# Patient Record
Sex: MALE | Race: WHITE | Employment: FULL TIME | URBAN - METROPOLITAN AREA
[De-identification: names, ages, dates, MRNs, and addresses within clinical notes are randomized per-mention and may not be internally consistent; named-entity substitution may affect disease eponyms.]

---

## 2018-11-23 ENCOUNTER — RADIANT APPOINTMENT (OUTPATIENT)
Dept: GENERAL RADIOLOGY | Facility: CLINIC | Age: 23
End: 2018-11-23
Attending: STUDENT IN AN ORGANIZED HEALTH CARE EDUCATION/TRAINING PROGRAM
Payer: OTHER MISCELLANEOUS

## 2018-11-23 ENCOUNTER — OFFICE VISIT (OUTPATIENT)
Dept: URGENT CARE | Facility: URGENT CARE | Age: 23
End: 2018-11-23
Payer: OTHER MISCELLANEOUS

## 2018-11-23 VITALS
WEIGHT: 244.6 LBS | SYSTOLIC BLOOD PRESSURE: 128 MMHG | DIASTOLIC BLOOD PRESSURE: 70 MMHG | HEART RATE: 72 BPM | TEMPERATURE: 98.4 F | OXYGEN SATURATION: 98 %

## 2018-11-23 DIAGNOSIS — S99.921A INJURY OF RIGHT FOOT, INITIAL ENCOUNTER: ICD-10-CM

## 2018-11-23 DIAGNOSIS — S99.921A INJURY OF RIGHT FOOT, INITIAL ENCOUNTER: Primary | ICD-10-CM

## 2018-11-23 PROCEDURE — 99203 OFFICE O/P NEW LOW 30 MIN: CPT | Performed by: STUDENT IN AN ORGANIZED HEALTH CARE EDUCATION/TRAINING PROGRAM

## 2018-11-23 PROCEDURE — 73630 X-RAY EXAM OF FOOT: CPT | Mod: RT

## 2018-11-23 NOTE — PROGRESS NOTES
Subjective:   Nathan Nunez is a 23 year old male who presents for   Chief Complaint   Patient presents with     Work joblocal     11/22/2018 right ankle injury      Patient is here today for evaluation of right foot pain after an injury at work.  He is a night food  at [employer]  This happened on 11/22/18 at roughly 10:15 PM.  He reports that he stepped with his right foot onto a pallet to elevate himself up to get a item higher up on a shelf where he had an eversion injury to his ankle that caused him to fall.  He reports being able to immediately bear weight on his right foot, but this was significantly painful.  He did note that it was swollen over the medial aspect of his foot after this occurred, but with ice this has improved some.  He has continued to be able to bear weight, but is walking with a limp.  His pain is most localized over his navicular bone, but does have some lateral foot pain as well.  No numbness or tingling into his toes.  When he fell he did not injure any other part of his body.  He did not hit his head.  He did not lose consciousness.  He has been using ibuprofen with some relief in his pain.  He was supposed to go to work tonight, but is unable to do his job.  He denies any previous significant injury to his right foot or ankle.    He has submitted a claim to workers comp, and we will be submitting this for that.  He does need work restrictions.  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  He is on his feet throughout his whole shift.  He does wear steel toed  boots.    PMHX/PSHX/MEDS/ALLERGIES/SHX/FHX reviewed and updated in Epic.    There are no active problems to display for this patient.    No current outpatient prescriptions on file.     No current facility-administered medications for this visit.      ROS:  As above per HPI    Objective:   /70 (BP Location: Left arm, Patient Position: Chair, Cuff Size: Adult Large)  Pulse 72  Temp  98.4  F (36.9  C) (Oral)  Wt 244 lb 9.6 oz (110.9 kg)  SpO2 98%, There is no height or weight on file to calculate BMI.  Gen:  NAD, well-nourished, sitting in chair comfortably  CV: RRR without murmur  Pulm:  CTAB, no wheezes/rales/rhonchi, no increased work of breathing   MSK: Right foot/ankle-no obvious ecchymosis.  Mild swelling over the medial aspect of foot.  No redness or warmth to touch.  Full range of motion in dorsiflexion, plantarflexion, inversion and eversion.  Pain with resisted flexion and resisted inversion.  Pain to palpation over the navicular bone, specifically over the posterior tibial tendon attachment.  No pain over the base of the fifth metatarsal.  No pain over the medial or lateral malleoli.  Negative squeeze test.  Negative external stress test.  Negative anterior drawer.  No tenderness over the ATFL, CFL, or PTFL.  No tenderness over the head of the fibula.  Sensation to light touch intact.  Pulses 2+ at DP and PT.  Skin: no obvious rashes or abnormalities  Psych: Euthymic, linear thoughts, normal rate of speech    RIGHT FOOT THREE OR MORE VIEWS  11/23/2018 5:58 PM    COMPARISON: None.  FINDINGS: Negative right foot.  IMPRESSION: Negative.    Assessment & Plan:   Nathan Nunez, 23 year old male who presents with:  Nathan was seen today for work comp.    Diagnoses and all orders for this visit:    Injury of right foot, initial encounter  -     XR Foot Right G/E 3 Views; Future  23-year-old gentleman here for workers comp injury evaluation.  Had an eversion injury with fall and now complaining of medial sided right foot pain.  He has been able to bear weight, but is walking with a limp.  Exam significant for pain to palpation over the navicular.  Given localized tenderness x-ray was obtained but was negative for any acute fracture.  Discussed that he likely sprained his posterior tibialis tendon.  Recommended ice at least 3 times a day.  Discussed wearing shoes with good arch  support for the next few weeks as much as he can.  He denies having any issues with worsening pain in his steel toed work boots.  He was given a letter with work restrictions.  Discussed use of NSAIDs 3 times a day with food for the next 2 weeks.  Recommended he be seen for follow-up in 2 weeks if he is not improving.  At that time could consider physical therapy and/or a walking boot to help unload this tendon.  I did discuss that this is a 10 and that helps with the arch of his foot, as well as with plantar flexion inversion but discussed that he could take up to 6-8 weeks to  be pain-free.  Discussed warning signs and symptoms for him to return for reevaluation.  Discussed that he will likely need another office visit with his primary provider to clear him officially for work.  All of his questions were answered.    Phyllis Montanez, DO PGY3  [facility]    Options for treatment and/or follow-up care were reviewed with the patient. Nathan Nunez and/or legal guardian was engaged and actively involved in the decision making process. Patient/guardian verbalized understanding of the options discussed and was satisfied with the final plan.

## 2018-11-23 NOTE — MR AVS SNAPSHOT
After Visit Summary   11/23/2018    Nathan Nunez    MRN: [id number]           Patient Information     Date Of Birth          1995        Visit Information        Provider Department      11/23/2018 4:30 PM Phyllis Montanez,  [facility]        Today's Diagnoses     Injury of right foot, initial encounter    -  1      Care Instructions    When not at work try to wear tennis shoes, or something with a good arch support.      Ice (20 minutes at a time) and elevation at home when you can.  Try to do this at least 3 times day.      Ibuprofen as needed for pain.  Ibuprofen 600-800 mg 3 times a day.  Naproxen 1 tablet 2 times a day.  Be sure to take with a small meal or snack.  You can safely do this over the next 2-3 weeks.    For work, follow work restrictions as stated out in letter.  4 week return to work.    If in 2 weeks you are not improved, see your primary doctor for consideration of physical therapy or a walking boot to help unload your tendon that is likely sprained.    If anything worsens in the meantime, return for evaluation as needed.            Follow-ups after your visit        Who to contact     If you have questions or need follow up information about today's clinic visit or your schedule please contact [facility] directly at [phone].  Normal or non-critical lab and imaging results will be communicated to you by MyChart, letter or phone within 4 business days after the clinic has received the results. If you do not hear from us within 7 days, please contact the clinic through [employer] or phone. If you have a critical or abnormal lab result, we will notify you by phone as soon as possible.  Submit refill requests through M[employer]ax or call your pharmacy and they will forward the refill request to us. Please allow 3 business days for your refill to be completed.          Additional Information About Your Visit        Mpax  "Information     [employer] lets you send messages to your doctor, view your test results, renew your prescriptions, schedule appointments and more. To sign up, go to [email or web address] . Click on \"Log in\" on the left side of the screen, which will take you to the Welcome page. Then click on \"Sign up Now\" on the right side of the page.     You will be asked to enter the access code listed below, as well as some personal information. Please follow the directions to create your username and password.     Your access code is: [email or web address]  Expires: 2019  6:17 PM     Your access code will  in 90 days. If you need help or a new code, please call your [place] clinic or [phone].        Care EveryWhere ID     This is your Care EveryWhere ID. This could be used by other organizations to access your [place] medical records  [id number]        Your Vitals Were     Pulse Temperature Pulse Oximetry             72 98.4  F (36.9  C) (Oral) 98%          Blood Pressure from Last 3 Encounters:   18 128/70    Weight from Last 3 Encounters:   18 244 lb 9.6 oz (110.9 kg)               Primary Care Provider Office Phone # Fax #    [facility] [phone] [address] [id number]        Equal Access to Services     DONATO PARRA AH: Hadii randa ku hadasho Soomaali, waaxda luqadaha, qaybta kaalmada adeegyada, ridge aparicio. So [facility] [phone].    ATENCIÓN: Si habla español, tiene a swan disposición servicios gratuitos de asistencia lingüística. Neetu al [phone].    We comply with applicable federal civil rights laws and [place] laws. We do not discriminate on the basis of race, color, national origin, age, disability, sex, sexual orientation, or gender identity.            Thank you!     Thank you for choosing [facility]  for your care. Our goal is always to provide you with excellent care. Hearing back from our " patients is one way we can continue to improve our services. Please take a few minutes to complete the written survey that you may receive in the mail after your visit with us. Thank you!             Your Updated Medication List - Protect others around you: Learn how to safely use, store and throw away your medicines at www.disposemymeds.org.      Notice  As of 11/23/2018  6:17 PM    You have not been prescribed any medications.

## 2018-11-23 NOTE — LETTER
RETURN TO WORK/SCHOOL FORM    11/23/2018    Re: Nathan Nunez  1995      To Whom It May Concern:     Nathan Nunez was seen in clinic today..  He may return to work with restrictions on 11/28/18.  He should not work until this time due to his work-related injury.     He can return on 11/28/2018 to light duty including the following listed below:    - Week 1 he should be on his feet no more than 2 hours in a 10 hour shift.  These 2 hours should be .  - Week 2 week (12/5/2018) he can be on his feet for 4 hours at a time.   them out if possible.   - Week 3 will be 8 hours on his feet at a time.  - Week 4 he can return to full duty as tolerated.      He should be re-evaluated as needed if pain does not improve, or worsens.  Return for work clearance as indicated by Worker's Comp.           Phyllis Montanez DO  11/23/2018 6:09 PM

## 2018-11-24 NOTE — PATIENT INSTRUCTIONS
When not at work try to wear tennis shoes, or something with a good arch support.      Ice (20 minutes at a time) and elevation at home when you can.  Try to do this at least 3 times day.      Ibuprofen as needed for pain.  Ibuprofen 600-800 mg 3 times a day.  Naproxen 1 tablet 2 times a day.  Be sure to take with a small meal or snack.  You can safely do this over the next 2-3 weeks.    For work, follow work restrictions as stated out in letter.  4 week return to work.    If in 2 weeks you are not improved, see your primary doctor for consideration of physical therapy or a walking boot to help unload your tendon that is likely sprained.    If anything worsens in the meantime, return for evaluation as needed.

## 2018-12-12 ENCOUNTER — OFFICE VISIT (OUTPATIENT)
Dept: URGENT CARE | Facility: URGENT CARE | Age: 23
End: 2018-12-12
Payer: OTHER MISCELLANEOUS

## 2018-12-12 VITALS
DIASTOLIC BLOOD PRESSURE: 71 MMHG | RESPIRATION RATE: 18 BRPM | WEIGHT: 254 LBS | SYSTOLIC BLOOD PRESSURE: 113 MMHG | TEMPERATURE: 98.1 F | OXYGEN SATURATION: 97 % | HEART RATE: 99 BPM

## 2018-12-12 DIAGNOSIS — S99.921D INJURY OF RIGHT FOOT, SUBSEQUENT ENCOUNTER: Primary | ICD-10-CM

## 2018-12-12 PROCEDURE — 99213 OFFICE O/P EST LOW 20 MIN: CPT | Performed by: PHYSICIAN ASSISTANT

## 2018-12-12 ASSESSMENT — ENCOUNTER SYMPTOMS
CHILLS: 0
EYE ITCHING: 0
RHINORRHEA: 0
FREQUENCY: 0
ABDOMINAL PAIN: 0
HEMATURIA: 0
DIAPHORESIS: 0
SHORTNESS OF BREATH: 0
CHEST TIGHTNESS: 0
CONSTITUTIONAL NEGATIVE: 1
ENDOCRINE NEGATIVE: 1
WHEEZING: 0
EYE DISCHARGE: 0
ARTHRALGIAS: 1
NEUROLOGICAL NEGATIVE: 1
PALPITATIONS: 0
CARDIOVASCULAR NEGATIVE: 1
DYSURIA: 0
HEADACHES: 0
ADENOPATHY: 0
SORE THROAT: 0
VOMITING: 0
DIARRHEA: 0
NAUSEA: 0
GASTROINTESTINAL NEGATIVE: 1
MYALGIAS: 0
DIZZINESS: 0
POLYDIPSIA: 0
FEVER: 0
RESPIRATORY NEGATIVE: 1
EYE REDNESS: 0
WEAKNESS: 0
COUGH: 0
LIGHT-HEADEDNESS: 0
EYES NEGATIVE: 1

## 2018-12-12 NOTE — PROGRESS NOTES
Chief Complaint:    Chief Complaint   Patient presents with     Work Comp     right ankle- 11/22/18- better now       HPI: Nathan Nunez is an 23 year old male who presents for follow up of R foot injury at work on 11/22/2018.  Patient states that his foot is better and would like a letter to return to work.  He denies any foot pain.  No numbness or tingling.      ROS:      Review of Systems   Constitutional: Negative.  Negative for chills, diaphoresis and fever.   HENT: Negative.  Negative for congestion, ear pain, rhinorrhea and sore throat.    Eyes: Negative.  Negative for discharge, redness and itching.   Respiratory: Negative.  Negative for cough, chest tightness, shortness of breath and wheezing.    Cardiovascular: Negative.  Negative for chest pain and palpitations.   Gastrointestinal: Negative.  Negative for abdominal pain, diarrhea, nausea and vomiting.   Endocrine: Negative.  Negative for polydipsia and polyuria.   Genitourinary: Negative for dysuria, frequency, hematuria and urgency.   Musculoskeletal: Positive for arthralgias. Negative for myalgias.   Skin: Negative for rash.   Allergic/Immunologic: Negative for immunocompromised state.   Neurological: Negative.  Negative for dizziness, weakness, light-headedness and headaches.   Hematological: Negative for adenopathy.        Family History   History reviewed. No pertinent family history.    Social History  Social History     Socioeconomic History     Marital status:      Spouse name: Not on file     Number of children: Not on file     Years of education: Not on file     Highest education level: Not on file   Social Needs     Financial resource strain: Not on file     Food insecurity - worry: Not on file     Food insecurity - inability: Not on file     Transportation needs - medical: Not on file     Transportation needs - non-medical: Not on file   Occupational History     Not on file   Tobacco Use     Smoking status: Never Smoker      Smokeless tobacco: Never Used   Substance and Sexual Activity     Alcohol use: Yes     Comment: ocasionally      Drug use: Not on file     Sexual activity: Not on file   Other Topics Concern     Parent/sibling w/ CABG, MI or angioplasty before 65F 55M? Not Asked   Social History Narrative     Not on file        Surgical History:  No past surgical history on file.     Problem List:  There is no problem list on file for this patient.       Allergies:  No Known Allergies     Current Meds:  No current outpatient medications on file.     PHYSICAL EXAM:     Vital signs noted and reviewed by Bhaskar Lorenzo  /71 (BP Location: Left arm, Patient Position: Chair, Cuff Size: Adult Large)   Pulse 99   Temp 98.1  F (36.7  C) (Oral)   Resp 18   Wt 115.2 kg (254 lb)   SpO2 97%      PEFR:    Physical Exam   Constitutional: He appears well-developed and well-nourished. No distress.   HENT:   Head: Normocephalic and atraumatic.   Right Ear: Tympanic membrane and external ear normal.   Left Ear: Tympanic membrane and external ear normal.   Mouth/Throat: Oropharynx is clear and moist.   Eyes: EOM are normal. Pupils are equal, round, and reactive to light.   Neck: Normal range of motion. Neck supple.   Cardiovascular: Normal rate, regular rhythm, normal heart sounds and intact distal pulses. Exam reveals no gallop and no friction rub.   No murmur heard.  Pulmonary/Chest: Effort normal and breath sounds normal. No respiratory distress.   Abdominal: Soft. Bowel sounds are normal. He exhibits no distension. There is no tenderness.   Musculoskeletal:        Right foot: Normal. There is normal range of motion, no tenderness, no bony tenderness, no swelling, normal capillary refill and no deformity.   Lymphadenopathy:     He has no cervical adenopathy.   Neurological: He is alert. No cranial nerve deficit.   Skin: Skin is warm and dry. No rash noted. He is not diaphoretic.   Psychiatric: He has a normal mood and affect.   Nursing  note and vitals reviewed.         ASSESSMENT:     1. Injury of right foot, subsequent encounter           PLAN:     Patient given letter to return to work with no restrictions his next available shift.  Patient verbalized understanding and agreed with this plan.     Bhaskar Lorenzo  12/12/2018, 5:06 PM

## 2018-12-12 NOTE — LETTER
[facility]  [address]  [place] [address]          December 12, 2018    RE:  Nathan Hutchinson Enrique                                                                                                                                                       [address] [facility] [place] [address]            To whom it may concern:    Nathankumar Hutchinson Enrique is under my professional care for Injury of right foot, subsequent encounter He may return to work with no restrictions his next available shift.    Sincerely,        Bhaskar Lorenzo PA-C

## 2019-02-12 ENCOUNTER — OFFICE VISIT (OUTPATIENT)
Dept: FAMILY MEDICINE | Facility: CLINIC | Age: 24
End: 2019-02-12
Payer: OTHER MISCELLANEOUS

## 2019-02-12 VITALS
HEART RATE: 86 BPM | DIASTOLIC BLOOD PRESSURE: 72 MMHG | SYSTOLIC BLOOD PRESSURE: 120 MMHG | BODY MASS INDEX: 34.19 KG/M2 | TEMPERATURE: 98.6 F | HEIGHT: 72 IN | OXYGEN SATURATION: 96 % | WEIGHT: 252.4 LBS

## 2019-02-12 DIAGNOSIS — M25.562 ACUTE PAIN OF LEFT KNEE: Primary | ICD-10-CM

## 2019-02-12 PROCEDURE — 99214 OFFICE O/P EST MOD 30 MIN: CPT | Performed by: FAMILY MEDICINE

## 2019-02-12 ASSESSMENT — ENCOUNTER SYMPTOMS
WHEEZING: 0
PALPITATIONS: 0
EYE PAIN: 0
SEIZURES: 0
SHORTNESS OF BREATH: 0
FATIGUE: 0
FEVER: 0
HALLUCINATIONS: 0
DECREASED CONCENTRATION: 0
ACTIVITY CHANGE: 0
COLOR CHANGE: 0
CONFUSION: 0
NERVOUS/ANXIOUS: 0
ARTHRALGIAS: 1
EYE DISCHARGE: 0
AGITATION: 0
GASTROINTESTINAL NEGATIVE: 1
HEADACHES: 0
DIZZINESS: 0
COUGH: 0
APPETITE CHANGE: 0

## 2019-02-12 ASSESSMENT — MIFFLIN-ST. JEOR: SCORE: 2169.94

## 2019-02-12 NOTE — LETTER
February 12, 2019      Nathan Nunez  [address] [address]        To Whom It May Concern:    Nathan Nunez was seen in our clinic. He may return to work with the following restrictions and light duty for 2 weeks:    No heavy lifting  No squatting   No excessive walking   No strenuous activities involving left knee      Sincerely,        Dr. Nestor Shultz

## 2019-02-12 NOTE — PROGRESS NOTES
SUBJECTIVE:   Nathan Nunez is a 23 year old male who presents to clinic today for the following health issues:      Chief Complaint   Patient presents with     Musculoskeletal Problem     WC left leg injury 2/10       There is no problem list on file for this patient.    History reviewed. No pertinent surgical history.    Social History     Tobacco Use     Smoking status: Never Smoker     Smokeless tobacco: Never Used   Substance Use Topics     Alcohol use: Yes     Comment: ocasionally      Family History   Problem Relation Age of Onset     Cancer Mother 56        breast cancer         No current outpatient medications on file.     No Known Allergies    Reviewed and updated as needed this visit by clinical staff  Tobacco  Allergies  Meds  Problems  Med Hx  Surg Hx  Fam Hx  Soc Hx        Reviewed and updated as needed this visit by Provider  Tobacco  Allergies  Meds  Problems  Med Hx  Surg Hx  Fam Hx        1. Left knee swelling: Started on Sunday.  Left ankle pain and swelling.  Patient was working at 630 pm at night.  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  Unable to bear weight.  Patient make a worker's comp and advised to go ER.  Patient went to [facility]  Had xrays performed of knee which did not show any fractures.  Had wrap and ibuprofen.  As of today, continues to have knee swelling but can bear some weight on it.  Painful to flex knee.  Hard to place weight on it.  States of 6/10 associated with activities, 10/10 pain before.    ROS:  Review of Systems   Constitutional: Negative for activity change, appetite change, fatigue and fever.   HENT: Negative.    Eyes: Negative for pain, discharge and visual disturbance.   Respiratory: Negative for cough, shortness of breath and wheezing.    Cardiovascular: Negative for chest pain, palpitations and leg swelling.   Gastrointestinal: Negative.    Musculoskeletal:  "Positive for arthralgias (left knee swelling and pain).   Skin: Negative for color change and rash.   Neurological: Negative for dizziness, seizures and headaches.   Psychiatric/Behavioral: Negative for agitation, confusion, decreased concentration and hallucinations. The patient is not nervous/anxious.        OBJECTIVE:     /72 (BP Location: Left arm, Cuff Size: Adult Large)   Pulse 86   Temp 98.6  F (37  C) (Tympanic)   Ht 1.816 m (5' 11.5\")   Wt 114.5 kg (252 lb 6.4 oz)   SpO2 96%   BMI 34.71 kg/m    Body mass index is 34.71 kg/m .  Physical Exam   Constitutional: He is oriented to person, place, and time. He appears well-developed and well-nourished. No distress.   HENT:   Head: Normocephalic and atraumatic.   Nose: Nose normal.   Eyes: Conjunctivae and EOM are normal.   Neck: Normal range of motion. No tracheal deviation present.   Cardiovascular: Normal rate, regular rhythm and normal heart sounds.   Pulmonary/Chest: Effort normal. He has no wheezes.   Musculoskeletal: Normal range of motion.   Normal symmetry, abnormal gait, unable to squat, no knee effusion, patella intact, nontender, good ROM in regards to active knee flexion (hamstrings) and extension (quadriceps), negative eR's, negative Lachman's     Neurological: He is alert and oriented to person, place, and time.   Skin: No rash noted. No erythema.   Psychiatric: He has a normal mood and affect. His behavior is normal.     ASSESSMENT/PLAN:     1. Acute pain of left knee  S/P trauma on 2/10/19 at work due a crush injury.  ACE wrap provided.  Encourage rest, ice, compression and elevation.  May continue with ibuprofen.  If symptoms do not improve, consider knee MRI  - TERE PT, HAND, AND CHIROPRACTIC REFERRAL; Future    See Patient Instructions    Nestor Shultz, DO  [facility]    "

## 2019-02-12 NOTE — PATIENT INSTRUCTIONS
[facility] Nathan Nunez,    Thank you for allowing [facility] to manage your care.    I made a physical therapy referral, please call to scheduled/they will be in 1-2 weeks to set up your appointment.  If you do not hear from them, please call the specialty number on your after visit.     If you have any questions or concerns, please feel free to call us at [phone].    Sincerely,    Dr. Shultz

## 2019-02-27 NOTE — PROGRESS NOTES
SUBJECTIVE:   Nathan Nunez is a 23 year old male who presents to clinic today for the following health issues:    There is no problem list on file for this patient.    History reviewed. No pertinent surgical history.    Social History     Tobacco Use     Smoking status: Never Smoker     Smokeless tobacco: Never Used   Substance Use Topics     Alcohol use: Yes     Comment: ocasionally      Family History   Problem Relation Age of Onset     Cancer Mother 56        breast cancer         No current outpatient medications on file.     No Known Allergies    Reviewed and updated as needed this visit by clinical staff  Tobacco  Allergies  Meds  Problems  Med Hx  Surg Hx  Fam Hx  Soc Hx        Reviewed and updated as needed this visit by Provider  Tobacco  Allergies  Meds  Problems  Med Hx  Surg Hx  Fam Hx       1. Left knee f/u: Patient sustained a left knee injury on 2/10/19 at work.  As today, states that pain has improved and able to bear weight.  But continues to intermittent pain.  Noticed more pain when patient lifts off left leg.  Mild swelling.  States of soreness around knee cap.  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  States of 3-4 pain but enough to notice.  Gets worse going up flight of stairs.       ROS:  Review of Systems   Constitutional: Negative for activity change, appetite change, fatigue and fever.   HENT: Negative.    Eyes: Negative for pain, discharge and visual disturbance.   Respiratory: Negative for cough, shortness of breath and wheezing.    Cardiovascular: Negative for chest pain, palpitations and leg swelling.   Gastrointestinal: Negative.    Endocrine: Negative.    Genitourinary: Negative.    Musculoskeletal: Positive for arthralgias (left knee).   Skin: Negative for color change and rash.   Allergic/Immunologic: Negative.    Neurological: Negative for dizziness, seizures and headaches.   Hematological: Negative.   "  Psychiatric/Behavioral: Negative for agitation, confusion, decreased concentration and hallucinations. The patient is not nervous/anxious.        OBJECTIVE:     /70 (BP Location: Left arm, Cuff Size: Adult Large)   Pulse 74   Temp 97.3  F (36.3  C) (Tympanic)   Ht 1.816 m (5' 11.5\")   Wt 116.3 kg (256 lb 6.4 oz)   SpO2 97%   BMI 35.26 kg/m    Body mass index is 35.26 kg/m .  Physical Exam   Constitutional: He is oriented to person, place, and time. He appears well-developed and well-nourished. No distress.   HENT:   Head: Normocephalic and atraumatic.   Nose: Nose normal.   Eyes: Conjunctivae and EOM are normal.   Neck: Normal range of motion. No tracheal deviation present.   Cardiovascular: Normal rate, regular rhythm and normal heart sounds.   Pulmonary/Chest: Effort normal. He has no wheezes.   Musculoskeletal: Normal range of motion.   Mild swelling, normal gait, unable to squat, no knee effusion, patella intact, nontender, good ROM in regards to active knee flexion (hamstrings) and extension (quadriceps), negative Re's, negative Lachman's     Neurological: He is alert and oriented to person, place, and time.   Skin: No rash noted. No erythema.   Psychiatric: He has a normal mood and affect. His behavior is normal.       ASSESSMENT/PLAN:     1. Knee strain, left, sequela  S/P work injury on 2/10/19.  Continue with knee brace.  Light duty for 1 month.  Light duty paperwork completed.  May take tylenol or ibuprofen as needed for pain.     See Patient Instructions    Nestor Shultz, DO  [facility]    "

## 2019-03-01 ENCOUNTER — OFFICE VISIT (OUTPATIENT)
Dept: FAMILY MEDICINE | Facility: CLINIC | Age: 24
End: 2019-03-01
Payer: OTHER MISCELLANEOUS

## 2019-03-01 VITALS
HEIGHT: 72 IN | OXYGEN SATURATION: 97 % | TEMPERATURE: 97.3 F | DIASTOLIC BLOOD PRESSURE: 70 MMHG | BODY MASS INDEX: 34.73 KG/M2 | WEIGHT: 256.4 LBS | HEART RATE: 74 BPM | SYSTOLIC BLOOD PRESSURE: 110 MMHG

## 2019-03-01 DIAGNOSIS — S86.912S KNEE STRAIN, LEFT, SEQUELA: Primary | ICD-10-CM

## 2019-03-01 PROCEDURE — 99213 OFFICE O/P EST LOW 20 MIN: CPT | Performed by: FAMILY MEDICINE

## 2019-03-01 ASSESSMENT — ENCOUNTER SYMPTOMS
FEVER: 0
HEMATOLOGIC/LYMPHATIC NEGATIVE: 1
SEIZURES: 0
APPETITE CHANGE: 0
HALLUCINATIONS: 0
CONFUSION: 0
WHEEZING: 0
FATIGUE: 0
EYE DISCHARGE: 0
HEADACHES: 0
SHORTNESS OF BREATH: 0
COLOR CHANGE: 0
ALLERGIC/IMMUNOLOGIC NEGATIVE: 1
EYE PAIN: 0
DIZZINESS: 0
ENDOCRINE NEGATIVE: 1
ARTHRALGIAS: 1
AGITATION: 0
GASTROINTESTINAL NEGATIVE: 1
NERVOUS/ANXIOUS: 0
DECREASED CONCENTRATION: 0
ACTIVITY CHANGE: 0
PALPITATIONS: 0
COUGH: 0

## 2019-03-01 ASSESSMENT — MIFFLIN-ST. JEOR: SCORE: 2188.08

## 2019-03-01 NOTE — PATIENT INSTRUCTIONS
Greetings Nathan Nunez,    Thank you for allowing [facility] to manage your care.    Encourage knee sleeve during the day.     If you have any questions or concerns, please feel free to call us at [phone].    Sincerely,    Dr. Shultz

## 2019-03-13 ENCOUNTER — OFFICE VISIT (OUTPATIENT)
Dept: FAMILY MEDICINE | Facility: CLINIC | Age: 24
End: 2019-03-13
Payer: OTHER MISCELLANEOUS

## 2019-03-13 VITALS
DIASTOLIC BLOOD PRESSURE: 71 MMHG | SYSTOLIC BLOOD PRESSURE: 119 MMHG | WEIGHT: 262.1 LBS | BODY MASS INDEX: 35.5 KG/M2 | HEIGHT: 72 IN | RESPIRATION RATE: 16 BRPM | HEART RATE: 80 BPM | TEMPERATURE: 98.3 F

## 2019-03-13 DIAGNOSIS — S83.92XD SPRAIN OF LEFT KNEE, UNSPECIFIED LIGAMENT, SUBSEQUENT ENCOUNTER: Primary | ICD-10-CM

## 2019-03-13 PROCEDURE — 99213 OFFICE O/P EST LOW 20 MIN: CPT | Performed by: FAMILY MEDICINE

## 2019-03-13 ASSESSMENT — ENCOUNTER SYMPTOMS
EYE DISCHARGE: 0
WHEEZING: 0
PALPITATIONS: 0
ALLERGIC/IMMUNOLOGIC NEGATIVE: 1
FEVER: 0
HALLUCINATIONS: 0
SHORTNESS OF BREATH: 0
DECREASED CONCENTRATION: 0
SEIZURES: 0
EYE PAIN: 0
APPETITE CHANGE: 0
ENDOCRINE NEGATIVE: 1
ARTHRALGIAS: 1
COLOR CHANGE: 0
GASTROINTESTINAL NEGATIVE: 1
COUGH: 0
HEMATOLOGIC/LYMPHATIC NEGATIVE: 1
ACTIVITY CHANGE: 0
JOINT SWELLING: 0
FATIGUE: 0
DIZZINESS: 0
AGITATION: 0
HEADACHES: 0
NERVOUS/ANXIOUS: 0
CONFUSION: 0

## 2019-03-13 ASSESSMENT — PAIN SCALES - GENERAL: PAINLEVEL: NO PAIN (0)

## 2019-03-13 ASSESSMENT — MIFFLIN-ST. JEOR: SCORE: 2216.88

## 2019-03-13 NOTE — PATIENT INSTRUCTIONS
Greetings Nathan Nunez,    Thank you for allowing [facility] to manage your care.    Continue with elastic knee brace while working.     If you have any questions or concerns, please feel free to call us at [phone].    Sincerely,    Dr. Shultz

## 2019-03-13 NOTE — PROGRESS NOTES
SUBJECTIVE:   Nathan Nunez is a 24 year old male who presents to clinic today for the following health issues:    Chief Complaint   Patient presents with     Work Comp     follow up     *Patient here to follow up on left knee pain related to work.    *He says it is getting better, but it is  at times. He says going up and down stairs it is a little stiff.     There is no problem list on file for this patient.    History reviewed. No pertinent surgical history.    Social History     Tobacco Use     Smoking status: Never Smoker     Smokeless tobacco: Never Used   Substance Use Topics     Alcohol use: Yes     Comment: ocasionally      Family History   Problem Relation Age of Onset     Cancer Mother 56        breast cancer         No current outpatient medications on file.     No Known Allergies    Reviewed and updated as needed this visit by clinical staff  Tobacco  Allergies  Meds  Med Hx  Surg Hx  Fam Hx  Soc Hx      Reviewed and updated as needed this visit by Provider        1. Left knee f/u: No pain with sitting or walking.  However, he does have discomfort with walking up flight of stairs.  Has not required pain medications.  States that swelling has improved.  States that the elastic knee sleeve has helped symptoms.  Patient is requesting to return back to full duty slowly in regards to hours.     Review of Systems   Constitutional: Negative for activity change, appetite change, fatigue and fever.   HENT: Negative.    Eyes: Negative for pain, discharge and visual disturbance.   Respiratory: Negative for cough, shortness of breath and wheezing.    Cardiovascular: Negative for chest pain, palpitations and leg swelling.   Gastrointestinal: Negative.    Endocrine: Negative.    Genitourinary: Negative.    Musculoskeletal: Positive for arthralgias (left knee pain improved). Negative for joint swelling.   Skin: Negative for color change and rash.   Allergic/Immunologic: Negative.     Neurological: Negative for dizziness, seizures and headaches.   Hematological: Negative.    Psychiatric/Behavioral: Negative for agitation, confusion, decreased concentration and hallucinations. The patient is not nervous/anxious.        OBJECTIVE:     /71 (BP Location: Right arm, Patient Position: Sitting, Cuff Size: Adult Large)   Pulse 80   Temp 98.3  F (36.8  C) (Tympanic)   Resp 16   Ht 1.829 m (6')   Wt 118.9 kg (262 lb 1.6 oz)   BMI 35.55 kg/m    Body mass index is 35.55 kg/m .  Physical Exam   Constitutional: He is oriented to person, place, and time. He appears well-developed and well-nourished. No distress.   HENT:   Head: Normocephalic and atraumatic.   Nose: Nose normal.   Eyes: Conjunctivae and EOM are normal.   Neck: Normal range of motion. No tracheal deviation present.   Cardiovascular: Normal rate, regular rhythm and normal heart sounds.   Pulmonary/Chest: Effort normal. He has no wheezes.   Musculoskeletal: Normal range of motion.   Left knee: Normal symmetry, normal gait, able to squat, no knee effusion, patella intact, nontender, good ROM in regards to active knee flexion (hamstrings) and extension (quadriceps), negative Re's     Neurological: He is alert and oriented to person, place, and time.   Skin: No rash noted. No erythema.   Psychiatric: He has a normal mood and affect. His behavior is normal.     ASSESSMENT/PLAN:     1. Sprain of left knee, unspecified ligament, subsequent encounter  Continue with left knee elastic brace.  Letter written for patient in regards to slowly increasing to full duty.  May take ibuprofen as needed for pain.     See Patient Instructions    Nestor Shultz DO  [facility]

## 2019-03-13 NOTE — LETTER
March 13, 2019      Nathan Nunez  [address] [address]        To Whom It May Concern:    Nathan Nunez was seen in our clinic. He may return to work full duty with the following regimen:    1. Starting 3/13/19 3 hours of full duty per shift  2. Starting 3/20/19 5 hours of full duty per shift  3. Starting 3/27/19 7 hours of full duty per shift  4. Starting 4/3/19   9 hours of full duty per shift  5. Starting 4/10/19 May return to full duty without any restrictions.       Sincerely,        Dr. Nestor Shultz

## 2019-05-23 ENCOUNTER — ANCILLARY PROCEDURE (OUTPATIENT)
Dept: GENERAL RADIOLOGY | Facility: CLINIC | Age: 24
End: 2019-05-23
Attending: PHYSICIAN ASSISTANT
Payer: OTHER MISCELLANEOUS

## 2019-05-23 ENCOUNTER — OFFICE VISIT (OUTPATIENT)
Dept: FAMILY MEDICINE | Facility: CLINIC | Age: 24
End: 2019-05-23
Payer: OTHER MISCELLANEOUS

## 2019-05-23 VITALS
SYSTOLIC BLOOD PRESSURE: 133 MMHG | HEART RATE: 97 BPM | TEMPERATURE: 98 F | WEIGHT: 268 LBS | HEIGHT: 72 IN | BODY MASS INDEX: 36.3 KG/M2 | DIASTOLIC BLOOD PRESSURE: 71 MMHG

## 2019-05-23 DIAGNOSIS — M25.511 ACUTE PAIN OF RIGHT SHOULDER: Primary | ICD-10-CM

## 2019-05-23 DIAGNOSIS — M25.511 ACUTE PAIN OF RIGHT SHOULDER: ICD-10-CM

## 2019-05-23 PROCEDURE — 99213 OFFICE O/P EST LOW 20 MIN: CPT | Performed by: PHYSICIAN ASSISTANT

## 2019-05-23 PROCEDURE — 73030 X-RAY EXAM OF SHOULDER: CPT | Mod: RT

## 2019-05-23 ASSESSMENT — MIFFLIN-ST. JEOR: SCORE: 2243.64

## 2019-05-23 NOTE — LETTER
[facility]  [address] [address]  Phone: [phone]    May 23, 2019        Nathan Nunez  [address] [address]          To whom it may concern:    RE: Nathan Nunez    Patient was seen and treated today at our clinic.  Patient may return to work 5/23/19 with the following:    When the patient returns to work, the following restrictions apply until 6/3/19:    Reach Above Shoulders: Not at all (0 hours)  Lift, carry, push, and pull no more than: 5 lb   No repetitive lifting or repetitive motions    Please contact me for questions or concerns.      Sincerely,        Mohini Cabrera PA-C

## 2019-05-23 NOTE — PROGRESS NOTES
SUBJECTIVE:                                                    Nathan Nunez is a 24 year old male who presents to clinic today for the following health issues:    Joint Pain    Onset: Sunday() at work was grabbing cases and hear a pop in the shoulder. Pain increased through out the day. Unable to life anything without pain. Very painful to lift arm.        Description:   Location: right shoulder  Character: Sharp    Intensity: severe    Progression of Symptoms: same    Accompanying Signs & Symptoms:  Other symptoms: none    History:   Previous similar pain: YES      Precipitating factors:   Trauma or overuse: YES    Alleviating factors:  Improved by: ice  Therapies Tried and outcome: n/a       He is a  for [employer] - lifting all day 1/2 lb - 100 lb  He was grabbing case 15 lb on bottom pallet  He felt did not hear a pop  Maybe twisted shoulders?    Problem list and histories reviewed & adjusted, as indicated.  Additional history: none    ROS:  Other than noted above, general, HEENT, respiratory, cardiac, MS, and gastrointestinal systems are negative.     OBJECTIVE:                                                    /71   Pulse 97   Temp 98  F (36.7  C) (Tympanic)   Ht 1.829 m (6')   Wt 121.6 kg (268 lb)   BMI 36.35 kg/m   Body mass index is 36.35 kg/m .   GENERAL: healthy, alert, well nourished, well hydrated, no distress  RESP: lungs clear to auscultation - no rales, no rhonchi, no wheezes  CV: regular rates and rhythm, normal S1 S2, no S3 or S4 and no murmur, no click or rub -  SHOULDER Exam-Right   Inspection: no swelling, no bruising, no discoloration, no obvious deformity, no asymmetry, no glenohumeral joint anterior bulge, no distal clavicle elevation, no muscle atrophy, no scapular winging   Tenderness of: SC joint- no, clavicle(prox-mid)- no, clavicle-(mid-distal)- no, AC joint- no, acromion- no, anterior capsule- no, prox bicep tendon- no, greater tuberosity- yes, prox humerus-  yes, supraspinatous- yes, superior trapezious- no, rhomboids- no   Range of Motion: Active- forward flexion- 80 degrees, abduction- normal, external rotation- normal, internal rotation- unable due to pain  Range of Motion: Passive- flexion still limited due to pain   Strength: forward flexion- painful, weakness unable to discern due to pain. Otherwise symmetric   Special tests: Neers- POSITIVE, Alvares(supraspinatous)- POSITIVE and unable to lift off test        X-ray -   FINDINGS: There is no significant degenerative change. The acromioclavicular and coracoclavicular distances appear within normal limits. The subacromial space is maintained. There is no acute  fracture or demonstrated dislocation. There are no worrisome bony lesions.      Impression     IMPRESSION: No acute osseous abnormality demonstrated.       I independently viewed the x-ray, discussed with patient, and am awaiting radiology read.      ASSESSMENT/PLAN:                                                      ASSESSMENT/PLAN:      ICD-10-CM    1. Acute pain of right shoulder M25.511 XR Shoulder Right G/E 3 Views     ORTHO  REFERRAL     Restriction letter written    Patient Instructions   Light duty at work  Rest from aggravating activities  Ice your shoulder when sore.   Do hanging arm rolls - increase weight as you go  Stretch your shoulder by grabbing a broom handle with both hands spread apart. Elevate and stretch your sore shoulder by raising that arm with your other arm. Your sore arm is just hanging on to the handle and the handle does the stretching.  Walk your arm up the wall to stretch it  Keep shoulder in one place and rotate elbow side to side    Follow up with ortho/sports medicine next week      Mohini Cabrera PA-C   [facility]

## 2019-05-23 NOTE — PATIENT INSTRUCTIONS
Light duty at work  Rest from aggravating activities  Ice your shoulder when sore.   Do hanging arm rolls - increase weight as you go  Stretch your shoulder by grabbing a broom handle with both hands spread apart. Elevate and stretch your sore shoulder by raising that arm with your other arm. Your sore arm is just hanging on to the handle and the handle does the stretching.  Walk your arm up the wall to stretch it  Keep shoulder in one place and rotate elbow side to side    Follow up with ortho/sports medicine next week

## 2019-06-03 ENCOUNTER — OFFICE VISIT (OUTPATIENT)
Dept: ORTHOPEDICS | Facility: CLINIC | Age: 24
End: 2019-06-03
Payer: OTHER MISCELLANEOUS

## 2019-06-03 VITALS
BODY MASS INDEX: 36.3 KG/M2 | SYSTOLIC BLOOD PRESSURE: 108 MMHG | HEIGHT: 72 IN | DIASTOLIC BLOOD PRESSURE: 80 MMHG | WEIGHT: 268 LBS

## 2019-06-03 DIAGNOSIS — S46.911A STRAIN OF RIGHT SHOULDER, INITIAL ENCOUNTER: Primary | ICD-10-CM

## 2019-06-03 PROCEDURE — 99243 OFF/OP CNSLTJ NEW/EST LOW 30: CPT | Performed by: PEDIATRICS

## 2019-06-03 ASSESSMENT — MIFFLIN-ST. JEOR: SCORE: 2243.64

## 2019-06-03 NOTE — LETTER
6/3/2019         RE: Nathan Nunez  [address] [address]        Dear Colleague,    Thank you for referring your patient, Nathan Nunez, to the [facility] [place]. Please see a copy of my visit note below.    Sports Medicine Clinic Visit    PCP: Nestor Shultz    Nathan Nunez is a 24 year old male who is seen  in consultation at the request of  Mohini Cabrera PA-C presenting with right shoulder pain  RIGHT hand dominant.    Injury: Was at work, went to grab some cases on the bottom of the pallet and heard a pop in the shoulder    **  Reached down forward. As lifted something, maybe 10-15 lbs, noted a pop at right shoulder. Had some sharp pain. Was able to continue to work, but with pain. By end of next aisle, had more pain and limited strength. Then reported injury.    Doing light duty. Tolerating light duty from a work standpoint. 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  Does not pop consistently with motion, but when pops then gets pain.  Pop is more superolateral.      Location of Pain: right anterior shoulder  Duration of Pain: 5/19/19, ~2 weeks  Rating of Pain at worst: 8/10  Rating of Pain Currently: 0/10  Symptoms are better with: Rest  Symptoms are worse with: horizontal abduction, abduction, lifting, reaching  Additional Features:   Positive: popping, catching, locking and weakness   Negative: swelling, bruising, grinding, instability, paresthesias, numbness and pain in other joints  Other evaluation and/or treatments so far consists of: Ice, Heat and Rest  Prior History of related problems: shoulder strain ~1 year ago; had resolved, no residual issues.    Social History:  for [employer]    Review of Systems  Musculoskeletal: as above  Remainder of review of systems is negative including constitutional, CV, pulmonary, GI, Skin and Neurologic except as noted in HPI  or medical history.    PMHx: above  PSHx: noncontributory    Family History   Problem Relation Age of Onset     Cancer Mother 56        breast cancer     Social History     Socioeconomic History     Marital status:      Spouse name: Not on file     Number of children: Not on file     Years of education: Not on file     Highest education level: Not on file   Occupational History     Not on file   Social Needs     Financial resource strain: Not on file     Food insecurity:     Worry: Not on file     Inability: Not on file     Transportation needs:     Medical: Not on file     Non-medical: Not on file   Tobacco Use     Smoking status: Never Smoker     Smokeless tobacco: Never Used   Substance and Sexual Activity     Alcohol use: Yes     Comment: ocasionally      Drug use: No     Sexual activity: Yes     Partners: Female   Lifestyle     Physical activity:     Days per week: Not on file     Minutes per session: Not on file     Stress: Not on file   Relationships     Social connections:     Talks on phone: Not on file     Gets together: Not on file     Attends Hinduism service: Not on file     Active member of club or organization: Not on file     Attends meetings of clubs or organizations: Not on file     Relationship status: Not on file     Intimate partner violence:     Fear of current or ex partner: Not on file     Emotionally abused: Not on file     Physically abused: Not on file     Forced sexual activity: Not on file   Other Topics Concern     Parent/sibling w/ CABG, MI or angioplasty before 65F 55M? Not Asked   Social History Narrative     Not on file       Objective  /80 (BP Location: Right arm, Patient Position: Chair, Cuff Size: Adult Regular)   Ht 1.829 m (6')   Wt 121.6 kg (268 lb)   BMI 36.35 kg/m         GENERAL APPEARANCE: healthy, alert and no distress   GAIT: NORMAL  SKIN: no suspicious lesions or rashes  NEURO: Normal strength and tone, mentation intact and speech normal  PSYCH:   mentation appears normal and affect normal/bright  HEENT: no scleral icterus  CV: no extremity edema  RESP: nonlabored breathing      Right Shoulder exam    ROM:        forward flexion ~110-120        abduction grossly full       internal rotation 2-3 vertebral segments lower than left       external rotation grossly full  Pain with flexion, IR, mild with abduction    Tender:        acromioclavicular joint    Non Tender:       remainder of shoulder    Strength:        abduction 5/5       internal rotation 5/5       external rotation 5-/5    Impingement testing:       positive (+) Alvares       positive (+) empty can       positive (+) crossover       positive (+) O'antonio    Stability testing:       neg (-) apprehension but + mild pain       equivocal sulcus sign       neg (-) posterior compression    Skin:       no visible deformities       well perfused       capillary refill brisk    Sensation:        normal sensation over shoulder and upper extremity     Speed + pain  yergason neg      Radiology  Visualized radiographs of right shoulder 5/23/19, and reviewed the images with the patient.  Impression: no acute bony abnormality.    SHOULDER THREE VIEWS RIGHT  5/23/2019 11:58 AM      HISTORY: Outer/lateral shoulder pain, injury this week. Acute pain of  right shoulder.     COMPARISON: None.     FINDINGS: There is no significant degenerative change. The  acromioclavicular and coracoclavicular distances appear within normal  limits. The subacromial space is maintained. There is no acute  fracture or demonstrated dislocation. There are no worrisome bony  lesions.                                                                      IMPRESSION: No acute osseous abnormality demonstrated.     JOSE KIRKPATRICK MD      Assessment:  1. Strain of right shoulder, initial encounter        Plan:  Discussed the assessment with the patient.  We will treat as strain, though labrum injury cannot be excluded.  We discussed the following:  symptom treatment, activity modification/rest, imaging and rehab. Following discussion, plan:    Topical Treatments: Ice as needed  Over the counter medication: Patient's preferred OTC medication PRN  X-ray of the shoulder reviewed.  Discussed presents for additional imaging with MRI.  Plan to start with physical therapy.  If not improving, reconsider MRI.  Activity Modification: Discussed what to alter/avoid  Work Restrictions letter provided  Rehab: Physical Therapy: Referral placed  Follow up: 1 month if not improving, sooner if needed.  Questions answered. The patient indicates understanding of these issues and agrees with the plan.    Jeff Wolf DO, [facility]    CC: Mohini Cabrera      Patient Instructions   Start with physical therapy  Letter for work  Follow up ~1 month  Future consideration: MRI?      Disclaimer: This note consists of symbols derived from keyboarding, dictation and/or voice recognition software. As a result, there may be errors in the script that have gone undetected. Please consider this when interpreting information found in this chart.        Again, thank you for allowing me to participate in the care of your patient.        Sincerely,        Jeff Wolf DO

## 2019-06-03 NOTE — PROGRESS NOTES
Sports Medicine Clinic Visit    PCP: Nestor Shultz Jasper Nunez is a 24 year old male who is seen  in consultation at the request of  Mohini Cabrera PA-C presenting with right shoulder pain  RIGHT hand dominant.    Injury: Was at work, went to grab some cases on the bottom of the pallet and heard a pop in the shoulder    **  Reached down forward. As lifted something, maybe 10-15 lbs, noted a pop at right shoulder. Had some sharp pain. Was able to continue to work, but with pain. By end of next aisle, had more pain and limited strength. Then reported injury.    Doing light duty. Tolerating light duty from a work standpoint. 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  Does not pop consistently with motion, but when pops then gets pain.  Pop is more superolateral.      Location of Pain: right anterior shoulder  Duration of Pain: 5/19/19, ~2 weeks  Rating of Pain at worst: 8/10  Rating of Pain Currently: 0/10  Symptoms are better with: Rest  Symptoms are worse with: horizontal abduction, abduction, lifting, reaching  Additional Features:   Positive: popping, catching, locking and weakness   Negative: swelling, bruising, grinding, instability, paresthesias, numbness and pain in other joints  Other evaluation and/or treatments so far consists of: Ice, Heat and Rest  Prior History of related problems: shoulder strain ~1 year ago; had resolved, no residual issues.    Social History:  for [employer]    Review of Systems  Musculoskeletal: as above  Remainder of review of systems is negative including constitutional, CV, pulmonary, GI, Skin and Neurologic except as noted in HPI or medical history.    PMHx: above  PSHx: noncontributory    Family History   Problem Relation Age of Onset     Cancer Mother 56        breast cancer     Social History     Socioeconomic History     Marital status:      Spouse name: Not on file     Number of  children: Not on file     Years of education: Not on file     Highest education level: Not on file   Occupational History     Not on file   Social Needs     Financial resource strain: Not on file     Food insecurity:     Worry: Not on file     Inability: Not on file     Transportation needs:     Medical: Not on file     Non-medical: Not on file   Tobacco Use     Smoking status: Never Smoker     Smokeless tobacco: Never Used   Substance and Sexual Activity     Alcohol use: Yes     Comment: ocasionally      Drug use: No     Sexual activity: Yes     Partners: Female   Lifestyle     Physical activity:     Days per week: Not on file     Minutes per session: Not on file     Stress: Not on file   Relationships     Social connections:     Talks on phone: Not on file     Gets together: Not on file     Attends Taoist service: Not on file     Active member of club or organization: Not on file     Attends meetings of clubs or organizations: Not on file     Relationship status: Not on file     Intimate partner violence:     Fear of current or ex partner: Not on file     Emotionally abused: Not on file     Physically abused: Not on file     Forced sexual activity: Not on file   Other Topics Concern     Parent/sibling w/ CABG, MI or angioplasty before 65F 55M? Not Asked   Social History Narrative     Not on file       Objective  /80 (BP Location: Right arm, Patient Position: Chair, Cuff Size: Adult Regular)   Ht 1.829 m (6')   Wt 121.6 kg (268 lb)   BMI 36.35 kg/m         GENERAL APPEARANCE: healthy, alert and no distress   GAIT: NORMAL  SKIN: no suspicious lesions or rashes  NEURO: Normal strength and tone, mentation intact and speech normal  PSYCH:  mentation appears normal and affect normal/bright  HEENT: no scleral icterus  CV: no extremity edema  RESP: nonlabored breathing      Right Shoulder exam    ROM:        forward flexion ~110-120        abduction grossly full       internal rotation 2-3 vertebral segments  lower than left       external rotation grossly full  Pain with flexion, IR, mild with abduction    Tender:        acromioclavicular joint    Non Tender:       remainder of shoulder    Strength:        abduction 5/5       internal rotation 5/5       external rotation 5-/5    Impingement testing:       positive (+) Alvares       positive (+) empty can       positive (+) crossover       positive (+) O'antonio    Stability testing:       neg (-) apprehension but + mild pain       equivocal sulcus sign       neg (-) posterior compression    Skin:       no visible deformities       well perfused       capillary refill brisk    Sensation:        normal sensation over shoulder and upper extremity     Speed + pain  yergason neg      Radiology  Visualized radiographs of right shoulder 5/23/19, and reviewed the images with the patient.  Impression: no acute bony abnormality.    SHOULDER THREE VIEWS RIGHT  5/23/2019 11:58 AM      HISTORY: Outer/lateral shoulder pain, injury this week. Acute pain of  right shoulder.     COMPARISON: None.     FINDINGS: There is no significant degenerative change. The  acromioclavicular and coracoclavicular distances appear within normal  limits. The subacromial space is maintained. There is no acute  fracture or demonstrated dislocation. There are no worrisome bony  lesions.                                                                      IMPRESSION: No acute osseous abnormality demonstrated.     JOSE KIRKPATRICK MD      Assessment:  1. Strain of right shoulder, initial encounter        Plan:  Discussed the assessment with the patient.  We will treat as strain, though labrum injury cannot be excluded.  We discussed the following: symptom treatment, activity modification/rest, imaging and rehab. Following discussion, plan:    Topical Treatments: Ice as needed  Over the counter medication: Patient's preferred OTC medication PRN  X-ray of the shoulder reviewed.  Discussed presents for additional  imaging with MRI.  Plan to start with physical therapy.  If not improving, reconsider MRI.  Activity Modification: Discussed what to alter/avoid  Work Restrictions letter provided  Rehab: Physical Therapy: Referral placed  Follow up: 1 month if not improving, sooner if needed.  Questions answered. The patient indicates understanding of these issues and agrees with the plan.    Jeff Wolf DO, CABECKI    CC: Mohini Cabrera      Patient Instructions   Start with physical therapy  Letter for work  Follow up ~1 month  Future consideration: MRI?      Disclaimer: This note consists of symbols derived from keyboarding, dictation and/or voice recognition software. As a result, there may be errors in the script that have gone undetected. Please consider this when interpreting information found in this chart.

## 2019-06-03 NOTE — LETTER
REPORT OF WORK ABILITY    NOTE TO EMPLOYEE: You must promptly provide a copy of this report to your  employer or worker's compensation insurer, and Qualified Rehabilitation Consultant.    Date: 6/3/2019                     Employee Name: Nathan Nunez         YOB: 1995  Medical Record Number: [id number]   Soc.Sec.No: [id number]  Employer: None                Date of Injury: 5/19/19  Managed Care Organization / Insurance Company Name: UNKNOWN    Diagnosis: right shoulder injury, strain  Work Related: yes     MMI: NO  Permanent Partial Disability (PPD) likely: UNKNOWN    EMPLOYEE IS ABLE TO WORK: Return to work with restrictions on next scheduled work date. In place for 1 month.     RESTRICTIONS IF ANY:    Lift, carry:  Up to 5 lbs on right  Push/Pull:  Up to 5 lbs on right  Shoulder/Elbow:  right Avoid outstretched arms, Avoid repetitive motion and No operating machine/vibrating tools  Reach Above Shoulders:  Not at all (0 hours) on right    OTHER RESTRICTIONS: None    TREATMENT PLAN/NOTES: change work position for comfort, best work height mid chest to mid thigh, may need to restrict work activities for comfort, start PT and heat/cold pack for comfort and Rest as needed.              Jeff WEINSTEIN.

## 2019-06-06 ENCOUNTER — THERAPY VISIT (OUTPATIENT)
Dept: PHYSICAL THERAPY | Facility: CLINIC | Age: 24
End: 2019-06-06
Attending: PEDIATRICS
Payer: OTHER MISCELLANEOUS

## 2019-06-06 DIAGNOSIS — S46.911A STRAIN OF RIGHT SHOULDER, INITIAL ENCOUNTER: ICD-10-CM

## 2019-06-06 DIAGNOSIS — M25.511 ACUTE PAIN OF RIGHT SHOULDER: ICD-10-CM

## 2019-06-06 PROCEDURE — 97161 PT EVAL LOW COMPLEX 20 MIN: CPT | Mod: GP | Performed by: PHYSICAL THERAPIST

## 2019-06-06 PROCEDURE — 97110 THERAPEUTIC EXERCISES: CPT | Mod: GP | Performed by: PHYSICAL THERAPIST

## 2019-06-06 NOTE — LETTER
[facility]  [address] [address]  [phone]    2019    Re: Nathan Nunez   :   1995  MRN:  [id number]   REFERRING PHYSICIAN:   Jeff Wolf    [facility]    Date of Initial Evaluation:  2019  Visits:  Rxs Used: 1  Reason for Referral:     Strain of right shoulder, initial encounter  Acute pain of right shoulder    EVALUATION SUMMARY    [place] [facility] Initial Evaluation  Subjective:  Patient reports onset of right shoulder pain on 19 when he was picking up a case at work. He reports feeling a pop in the shoulder at the time. Most recently saw MD for this problem on 6/3/19The history is provided by the patient. No  was used. Nathan Nunez is a 24 year old male with a right shoulder condition.  Condition occurred with:  Lifting.  Condition occurred: at work.  This is a new condition   Patient reports pain:  Anterior.    Pain is described as sharp and is intermittent and reported as 6/10.  Associated symptoms:  Loss of motion/stiffness and loss of strength. Pain is worse during the day.  Symptoms are exacerbated by certain positions, lifting, using arm overhead, using arm behind back and using arm at shoulder level and relieved by rest.  Since onset symptoms are gradually improving.  Special tests:  X-ray.      General health as reported by patient is fair.  Pertinent medical history includes:  Overweight and sleep disorder/apnea.  Medical allergies: no.  Other surgeries include:  Other (pyloric stenosis).  Current medications:  None as reported by the patient.  Current occupation is Night selector.  Patient is working in normal job with restrictions (no lifting >5# and no repetitive lifting).  Primary job tasks include:  Lifting, repetitive tasks and other (pushing/pulling).Barriers include:  None as reported by the patient.Red flags:  None as reported by the  patient.           Objective:  Standing Alignment:    Shoulder/UE:  Rounded shoulders  Shoulder Evaluation:  ROM:  AROM:    Flexion:  Left:  158    Right:  152  Extension: Left: 59Right: 38  Abduction:  Left: 158   Right:  134  External Rotation:  Left:  82    Right:  82  Extension/Internal Rotation:  Left:  T4    Right:  T9    Strength:    Flexion: Left:5/5    Pain: -    Right: 4+/5      Pain:  -  Abduction:  Left: 5/5   Pain:-    Right: 4+/5      Pain:-  Internal Rotation:  Left:5/5      Pain:-    Right: 5-/5      Pain:-  External Rotation:   Left:5/5      Pain:-   Right:5-/5      Pain:-      Assessment/Plan:    Patient is a 24 year old male with right side shoulder complaints.    Patient has the following significant findings with corresponding treatment plan.                Diagnosis 1:  Right shoulder pain  Pain -  self management, education, directional preference exercise and home program  Decreased ROM/flexibility - manual therapy, therapeutic exercise and home program  Decreased strength - therapeutic exercise, therapeutic activities and home program  Impaired muscle performance - neuro re-education and home program  Decreased function - therapeutic activities and home program  Impaired posture - neuro re-education and home program  Therapy Evaluation Codes:   Cumulative Therapy Evaluation is: Low complexity.  Previous and current functional limitations:  (See Goal Flow Sheet for this information)    Short term and Long term goals: (See Goal Flow Sheet for this information)   Communication ability:  Patient appears to be able to clearly communicate and understand verbal and written communication and follow directions correctly.  Treatment Explanation - The following has been discussed with the patient:   RX ordered/plan of care  Anticipated outcomes  Possible risks and side effects  This patient would benefit from PT intervention to resume normal activities.   Rehab potential is good.  Frequency:  1 X week,  once daily  Duration:  for 6 weeks  Discharge Plan:  Achieve all LTG.  Independent in home treatment program.  Reach maximal therapeutic benefit.      Thank you for your referral.    INQUIRIES  Therapist: Jose Camejo DPT  [facility]  [address] [address]  Phone: [phone]  Fax: [phone]

## 2019-06-06 NOTE — PROGRESS NOTES
[place] [facility] Initial Evaluation  Subjective:  Patient reports onset of right shoulder pain on 5/19/19 when he was picking up a case at work. He reports feeling a pop in the shoulder at the time. Most recently saw MD for this problem on 6/3/19    The history is provided by the patient. No  was used.   Nathan Nunez is a 24 year old male with a right shoulder condition.  Condition occurred with:  Lifting.  Condition occurred: at work.  This is a new condition     Patient reports pain:  Anterior.    Pain is described as sharp and is intermittent and reported as 6/10.  Associated symptoms:  Loss of motion/stiffness and loss of strength. Pain is worse during the day.  Symptoms are exacerbated by certain positions, lifting, using arm overhead, using arm behind back and using arm at shoulder level and relieved by rest.  Since onset symptoms are gradually improving.  Special tests:  X-ray.      General health as reported by patient is fair.  Pertinent medical history includes:  Overweight and sleep disorder/apnea.  Medical allergies: no.  Other surgeries include:  Other (pyloric stenosis).  Current medications:  None as reported by the patient.  Current occupation is Night selector.  Patient is working in normal job with restrictions (no lifting >5# and no repetitive lifting).  Primary job tasks include:  Lifting, repetitive tasks and other (pushing/pulling).    Barriers include:  None as reported by the patient.    Red flags:  None as reported by the patient.                        Objective:  Standing Alignment:      Shoulder/UE:  Rounded shoulders                                       Shoulder Evaluation:  ROM:  AROM:    Flexion:  Left:  158    Right:  152  Extension: Left: 59Right: 38  Abduction:  Left: 158   Right:  134      External Rotation:  Left:  82    Right:  82            Extension/Internal Rotation:  Left:  T4    Right:  T9          Strength:    Flexion: Left:5/5     Pain: -    Right: 4+/5      Pain:  -    Abduction:  Left: 5/5   Pain:-    Right: 4+/5      Pain:-    Internal Rotation:  Left:5/5      Pain:-    Right: 5-/5      Pain:-  External Rotation:   Left:5/5      Pain:-   Right:5-/5      Pain:-                                                     General     ROS    Assessment/Plan:    Patient is a 24 year old male with right side shoulder complaints.    Patient has the following significant findings with corresponding treatment plan.                Diagnosis 1:  Right shoulder pain  Pain -  self management, education, directional preference exercise and home program  Decreased ROM/flexibility - manual therapy, therapeutic exercise and home program  Decreased strength - therapeutic exercise, therapeutic activities and home program  Impaired muscle performance - neuro re-education and home program  Decreased function - therapeutic activities and home program  Impaired posture - neuro re-education and home program    Therapy Evaluation Codes:     Cumulative Therapy Evaluation is: Low complexity.    Previous and current functional limitations:  (See Goal Flow Sheet for this information)    Short term and Long term goals: (See Goal Flow Sheet for this information)     Communication ability:  Patient appears to be able to clearly communicate and understand verbal and written communication and follow directions correctly.  Treatment Explanation - The following has been discussed with the patient:   RX ordered/plan of care  Anticipated outcomes  Possible risks and side effects  This patient would benefit from PT intervention to resume normal activities.   Rehab potential is good.    Frequency:  1 X week, once daily  Duration:  for 6 weeks  Discharge Plan:  Achieve all LTG.  Independent in home treatment program.  Reach maximal therapeutic benefit.    Please refer to the daily flowsheet for treatment today, total treatment time and time spent performing 1:1 timed codes.

## 2019-06-14 ENCOUNTER — THERAPY VISIT (OUTPATIENT)
Dept: PHYSICAL THERAPY | Facility: CLINIC | Age: 24
End: 2019-06-14
Payer: OTHER MISCELLANEOUS

## 2019-06-14 DIAGNOSIS — M25.511 ACUTE PAIN OF RIGHT SHOULDER: ICD-10-CM

## 2019-06-14 PROCEDURE — 97112 NEUROMUSCULAR REEDUCATION: CPT | Mod: GP | Performed by: PHYSICAL THERAPIST

## 2019-06-14 PROCEDURE — 97110 THERAPEUTIC EXERCISES: CPT | Mod: GP | Performed by: PHYSICAL THERAPIST

## 2019-06-20 ENCOUNTER — TELEPHONE (OUTPATIENT)
Dept: ORTHOPEDICS | Facility: CLINIC | Age: 24
End: 2019-06-20

## 2019-06-20 NOTE — TELEPHONE ENCOUNTER
Sarah, nurse  LVM requesting a status update on a letter that she faxed on 6/13. Please fax response letter to [phone]

## 2019-06-21 ENCOUNTER — THERAPY VISIT (OUTPATIENT)
Dept: PHYSICAL THERAPY | Facility: CLINIC | Age: 24
End: 2019-06-21
Payer: OTHER MISCELLANEOUS

## 2019-06-21 DIAGNOSIS — M25.511 ACUTE PAIN OF RIGHT SHOULDER: ICD-10-CM

## 2019-06-21 PROCEDURE — 97110 THERAPEUTIC EXERCISES: CPT | Mod: GP | Performed by: PHYSICAL THERAPIST

## 2019-06-21 PROCEDURE — 97112 NEUROMUSCULAR REEDUCATION: CPT | Mod: GP | Performed by: PHYSICAL THERAPIST

## 2019-06-21 NOTE — TELEPHONE ENCOUNTER
Request received for date of MMI.  Office note faxed with information that it is too early to determine MMI, follow up anticipated 1 month from 6/3/19 office visit.    Lia Singh MS ATC

## 2019-06-21 NOTE — TELEPHONE ENCOUNTER
Clinic has not received any letter.  Called and spoke with  Sarah.  Gave her correct fax number.  She had questions about when PT was ordered.  Requested order and work note be faxed to her at .  Faxed information as requested  Lia Singh MS ATC

## 2019-06-21 NOTE — PROGRESS NOTES
Subjective:  HPI                    Objective:  System    Physical Exam    General     ROS    Assessment/Plan:    PROGRESS  REPORT    Progress reporting period is from 6/6/19 to 6/21/19.       SUBJECTIVE  Subjective changes noted by patient: Pt reports his shoulder has been feeling ok the past week; he reports he felt fine leaving PT last week but then was doing a lot of reaching and moving through stuff to find his wallet and got sore with that. He reports overall improvement with reaching but will get sore with repetitive use    Current Pain level: 0/10.     Initial Pain level: 6/10.   Changes in function:  Yes (See Goal flowsheet attached for changes in current functional level)  Adverse reaction to treatment or activity: None    OBJECTIVE  Changes noted in objective findings:  AROM right shoulder flexion 162, abd 155, IR/Ext T8     ASSESSMENT/PLAN  Updated problem list and treatment plan: Diagnosis 1:  Right shoulder pain  Pain -  self management, education, directional preference exercise and home program  Decreased ROM/flexibility - manual therapy, therapeutic exercise and home program  Impaired muscle performance - neuro re-education and home program  Decreased function - therapeutic activities and home program  Impaired posture - neuro re-education and home program  STG/LTGs have been met or progress has been made towards goals:  Yes (See Goal flow sheet completed today.)  Assessment of Progress: The patient's condition is improving.  Self Management Plans:  Patient has been instructed in a home treatment program.  Patient  has been instructed in self management of symptoms.  I have re-evaluated this patient and find that the nature, scope, duration and intensity of the therapy is appropriate for the medical condition of the patient.  Nathan continues to require the following intervention to meet STG and LTG's:  PT    Recommendations:  This patient would benefit from continued therapy.     Frequency:  1 X  week, once daily  Duration:  for 4 weeks        Please refer to the daily flowsheet for treatment today, total treatment time and time spent performing 1:1 timed codes.

## 2019-06-21 NOTE — LETTER
[place] [facility]  [address]  [address]  [place] [address]  [phone]    2019    Re: Nathan Nunez   :   1995  MRN:  [id number]   REFERRING PHYSICIAN:   Jeff Wolf    [place] [facility]    Date of Initial Evaluation:  2019  Visits:  Rxs Used: 3  Reason for Referral:  Acute pain of right shoulder    EVALUATION SUMMARY      PROGRESS  REPORT    Progress reporting period is from 19 to 19.       SUBJECTIVE  Subjective changes noted by patient: Pt reports his shoulder has been feeling ok the past week; he reports he felt fine leaving PT last week but then was doing a lot of reaching and moving through stuff to find his wallet and got sore with that. He reports overall improvement with reaching but will get sore with repetitive use    Current Pain level: 0/10.     Initial Pain level: 6/10.   Changes in function:  Yes (See Goal flowsheet attached for changes in current functional level)  Adverse reaction to treatment or activity: None    OBJECTIVE  Changes noted in objective findings:  AROM right shoulder flexion 162, abd 155, IR/Ext T8     ASSESSMENT/PLAN  Updated problem list and treatment plan: Diagnosis 1:  Right shoulder pain  Pain -  self management, education, directional preference exercise and home program  Decreased ROM/flexibility - manual therapy, therapeutic exercise and home program  Impaired muscle performance - neuro re-education and home program  Decreased function - therapeutic activities and home program  Impaired posture - neuro re-education and home program  STG/LTGs have been met or progress has been made towards goals:  Yes (See Goal flow sheet completed today.)  Assessment of Progress: The patient's condition is improving.  Self Management Plans:  Patient has been instructed in a home treatment program.  Patient  has been instructed in self management of symptoms.  I have  re-evaluated this patient and find that the nature, scope, duration and intensity of the therapy is appropriate for the medical condition of the patient.  Nathan continues to require the following intervention to meet STG and LTG's:  PT    Recommendations:  This patient would benefit from continued therapy.     Frequency:  1 X week, once daily  Duration:  for 4 weeks          Thank you for your referral.    INQUIRIES  Therapist: Jose Camejo DPT   [place] [facility]  [address]  [place] [address]  Phone: [phone]  Fax: [phone]

## 2019-07-03 ENCOUNTER — OFFICE VISIT (OUTPATIENT)
Dept: FAMILY MEDICINE | Facility: CLINIC | Age: 24
End: 2019-07-03
Payer: OTHER MISCELLANEOUS

## 2019-07-03 VITALS
HEIGHT: 72 IN | OXYGEN SATURATION: 95 % | TEMPERATURE: 98.7 F | BODY MASS INDEX: 36.3 KG/M2 | SYSTOLIC BLOOD PRESSURE: 122 MMHG | RESPIRATION RATE: 22 BRPM | HEART RATE: 88 BPM | WEIGHT: 268 LBS | DIASTOLIC BLOOD PRESSURE: 76 MMHG

## 2019-07-03 DIAGNOSIS — S46.911D STRAIN OF RIGHT SHOULDER, SUBSEQUENT ENCOUNTER: Primary | ICD-10-CM

## 2019-07-03 PROCEDURE — 99213 OFFICE O/P EST LOW 20 MIN: CPT | Performed by: PHYSICIAN ASSISTANT

## 2019-07-03 ASSESSMENT — MIFFLIN-ST. JEOR: SCORE: 2243.64

## 2019-07-03 NOTE — PROGRESS NOTES
Subjective     Nathan Nunez is a 24 year old male who presents to clinic today for the following health issues:    HPI   Work Comp. DOI:05/19/19  Injury: While at work reached down forward. As lifted something, maybe 10-15 lbs, noted a pop at right shoulder. Had some sharp pain. Was able to continue to work, but with pain. By end of next aisle, had more pain and limited strength. Then reported injury. Saw PT   Pt reports his shoulder has been feeling ok the past couple weeks; a week after PT ended he was doing a lot of reaching and moving through stuff to find his wallet and got sore with that. He reports overall improvement with reaching but will get sore with repetitive use.  Current Pain level: 0/10.     Initial Pain level: 6/10.   Denies: new injuries/pain etc    Ally DeShong CMA      He does a lot of lifting at work.  Shifts are 10+ hours.  Has been off since May 19th, 2019 (injury at work).   Most of the time, his shoulder feels back to normal.  Infrequently, he will have some pain with certain ROM.    Currently he has been on light duty at work and is hoping to work back to his regular shift              Patient Active Problem List   Diagnosis     Acute pain of right shoulder     History reviewed. No pertinent surgical history.    Social History     Tobacco Use     Smoking status: Never Smoker     Smokeless tobacco: Never Used   Substance Use Topics     Alcohol use: Yes     Comment: ocasionally      Family History   Problem Relation Age of Onset     Cancer Mother 56        breast cancer         No current outpatient medications on file.     BP Readings from Last 3 Encounters:   07/12/19 110/72   07/03/19 122/76   06/03/19 108/80    Wt Readings from Last 3 Encounters:   07/12/19 125.6 kg (277 lb)   07/03/19 121.6 kg (268 lb)   06/03/19 121.6 kg (268 lb)                      Reviewed and updated as needed this visit by Provider         Review of Systems   ROS COMP: Constitutional, HEENT,  cardiovascular, pulmonary, gi and gu systems are negative, except as otherwise noted.      Objective    /76   Pulse 88   Temp 98.7  F (37.1  C) (Tympanic)   Resp 22   Ht 1.829 m (6')   Wt 121.6 kg (268 lb)   SpO2 95%   BMI 36.35 kg/m    Body mass index is 36.35 kg/m .  Physical Exam   GENERAL: healthy, alert and no distress  Inspection: No obvious deformity, asymmetry, muscle atrophy or abnormal motion noted.   Palpation:  No pain over AC or SC joint, acromion and subacromial space, bicipital groove and greater/less tubercles, scapular spine and adjacent musculature, cervical spine.    External/Internal rotation strength:  full  Abduction/Adduction strength: normal  Biceps/Triceps strength: normal  Neck examination: normal  ROM/crepitus? normal  Capillary refills less than 2 seconds and radial pulses normal bilaterally.   Sensation intact bilateral fingers, hands and arms.                  Assessment & Plan     1. Strain of right shoulder, subsequent encounter  Will return him back to work with taper:      He suffered a shoulder injury at work on 5/19/19 and has been on light duty since that time.  His symptoms have improved with the physical therapy therefore he may return to work without lifting restrictions.  He should return with the following taper up on hours.      7/3/19-7/9/19 work no more than 4 hours per shift  7/10/19 - 7/17/16/19 work no more than 7 hours per shift  7/18/19 - 7/24/19 work no more than 9 hours per shift  7/25/19 and on:  No further restrictions.     BMI:   Estimated body mass index is 36.35 kg/m  as calculated from the following:    Height as of this encounter: 1.829 m (6').    Weight as of this encounter: 121.6 kg (268 lb).               Return in about 4 weeks (around 7/31/2019) for a recheck if symptoms do not improve.    Perla Alvarez PA-C  [facility]

## 2019-07-03 NOTE — LETTER
[facility]  [address] [place] [address]  Phone: [phone]    July 3, 2019        Nathan Nunez  [address] [address]          To whom it may concern:    RE: Nathan Nunez    Patient was seen and treated today at our clinic.  He suffered a shoulder injury at work on 5/19/19 and has been on light duty since that time.  His symptoms have improved with the physical therapy therefore he may return to work without lifting restrictions.  He should return with the following taper up on hours.      7/3/19-7/9/19 work no more than 4 hours per shift  7/10/19 - 7/17/16/19 work no more than 7 hours per shift  7/18/19 - 7/24/19 work no more than 9 hours per shift  7/25/19 and on:  No further restrictions.            Please contact me for questions or concerns.      Sincerely,        Perla Alvarez PA-C

## 2019-07-12 ENCOUNTER — OFFICE VISIT (OUTPATIENT)
Dept: FAMILY MEDICINE | Facility: CLINIC | Age: 24
End: 2019-07-12
Payer: OTHER MISCELLANEOUS

## 2019-07-12 VITALS
OXYGEN SATURATION: 97 % | HEART RATE: 86 BPM | BODY MASS INDEX: 37.52 KG/M2 | SYSTOLIC BLOOD PRESSURE: 110 MMHG | WEIGHT: 277 LBS | TEMPERATURE: 97.6 F | DIASTOLIC BLOOD PRESSURE: 72 MMHG | RESPIRATION RATE: 16 BRPM | HEIGHT: 72 IN

## 2019-07-12 DIAGNOSIS — S49.91XD INJURY OF RIGHT SHOULDER, SUBSEQUENT ENCOUNTER: Primary | ICD-10-CM

## 2019-07-12 PROCEDURE — 99214 OFFICE O/P EST MOD 30 MIN: CPT | Performed by: FAMILY MEDICINE

## 2019-07-12 ASSESSMENT — PAIN SCALES - GENERAL: PAINLEVEL: NO PAIN (0)

## 2019-07-12 ASSESSMENT — MIFFLIN-ST. JEOR: SCORE: 2284.46

## 2019-07-12 NOTE — NURSING NOTE
Chief Complaint   Patient presents with     Work Comp     Work comp follow up, he was released on 7/3/19 with restrictions/limited hours. (1st week being 4 hours, then 7 hours, then 9 hours, then full shift of 11) He is feeling he can tolerate going back to work full time now without any limitations.        Initial /72   Pulse 86   Temp 97.6  F (36.4  C) (Tympanic)   Resp 16   Ht 1.829 m (6')   Wt 125.6 kg (277 lb)   SpO2 97%   BMI 37.57 kg/m   Estimated body mass index is 37.57 kg/m  as calculated from the following:    Height as of this encounter: 1.829 m (6').    Weight as of this encounter: 125.6 kg (277 lb).    Medication Reconciliation: complete    Joann Edmonds MA

## 2019-07-12 NOTE — LETTER
[facility]  [address] [address]  Phone: [phone]    July 12, 2019      Nathan Nunez  [address] [address]      Dear Mr. Beckmanon    You may return to work at [employer] in [facility] [employer] today with no restrictions.     Sincerely,    / Enrique Sousa MD

## 2019-07-12 NOTE — PATIENT INSTRUCTIONS
(F42.91XD) Injury of right shoulder, subsequent encounter  (primary encounter diagnosis)  Comment:   Plan: we discussed the issues and will write a new letter to return to work today with no restrictions.   He may work full time. Recheck in clinic as needed. We will fax this to [phone].

## 2019-07-12 NOTE — PROGRESS NOTES
Subjective     Nathan Nunez is a 24 year old male who presents to clinic today for the following health issues:    Chief Complaint   Patient presents with     Work Comp     Work comp follow up, he was released on 7/3/19 with restrictions/limited hours. (1st week being 4 hours, then 7 hours, then 9 hours, then full shift of 11) He is feeling he can tolerate going back to work full time now without any limitations.      No current outpatient medications on file.    Patient Active Problem List   Diagnosis     Acute pain of right shoulder       Blood pressure 110/72, pulse 86, temperature 97.6  F (36.4  C), temperature source Tympanic, resp. rate 16, height 1.829 m (6'), weight 125.6 kg (277 lb), SpO2 97 %.    Exam:  GENERAL APPEARANCE: healthy, alert and no distress  MS: extremities normal- no gross deformities noted, no evidence of inflammation in the right shoulder and it is non tender to palpation, FROM in the right shoulder.  SKIN: no suspicious lesions or rashes  NEURO: Normal strength and tone, sensory exam grossly normal in the hands bilaterally, mentation intact and speech normal  PSYCH: mentation appears normal and affect normal/bright      (S49.91XD) Injury of right shoulder, subsequent encounter  (primary encounter diagnosis)  Comment:   Plan: we discussed the issues and will write a new letter to return to work today with no restrictions.   He may work full time. Recheck in clinic as needed. We will fax this to [phone].     Enrique Sousa

## 2019-07-13 ENCOUNTER — TELEPHONE (OUTPATIENT)
Dept: FAMILY MEDICINE | Facility: CLINIC | Age: 24
End: 2019-07-13

## 2019-07-13 NOTE — TELEPHONE ENCOUNTER
[employer] - RTW request form received.  Faxed Dr. Sousa's letter from 7/12/19 to [phone].

## 2019-09-05 ENCOUNTER — TELEPHONE (OUTPATIENT)
Dept: FAMILY MEDICINE | Facility: CLINIC | Age: 24
End: 2019-09-05

## 2019-09-06 NOTE — TELEPHONE ENCOUNTER
[employer] work comp - 7/2019 shoulder injury form completed and routed to Dr. Sousa for review and signature.

## 2019-09-12 ENCOUNTER — OFFICE VISIT (OUTPATIENT)
Dept: FAMILY MEDICINE | Facility: CLINIC | Age: 24
End: 2019-09-12
Payer: MEDICAID

## 2019-09-12 VITALS
HEART RATE: 72 BPM | DIASTOLIC BLOOD PRESSURE: 80 MMHG | BODY MASS INDEX: 39.25 KG/M2 | WEIGHT: 289.4 LBS | TEMPERATURE: 96.2 F | RESPIRATION RATE: 20 BRPM | SYSTOLIC BLOOD PRESSURE: 104 MMHG

## 2019-09-12 DIAGNOSIS — Z23 NEED FOR INFLUENZA VACCINATION: ICD-10-CM

## 2019-09-12 DIAGNOSIS — R68.89 FORGETFULNESS: ICD-10-CM

## 2019-09-12 DIAGNOSIS — G47.9 SLEEP DISTURBANCE: ICD-10-CM

## 2019-09-12 DIAGNOSIS — R41.840 CONCENTRATION DEFICIT: Primary | ICD-10-CM

## 2019-09-12 DIAGNOSIS — Z23 NEED FOR TDAP VACCINATION: ICD-10-CM

## 2019-09-12 LAB
ALBUMIN SERPL-MCNC: 3.9 G/DL (ref 3.4–5)
ALP SERPL-CCNC: 94 U/L (ref 40–150)
ALT SERPL W P-5'-P-CCNC: 64 U/L (ref 0–70)
ANION GAP SERPL CALCULATED.3IONS-SCNC: 4 MMOL/L (ref 3–14)
AST SERPL W P-5'-P-CCNC: 30 U/L (ref 0–45)
BASOPHILS # BLD AUTO: 0 10E9/L (ref 0–0.2)
BASOPHILS NFR BLD AUTO: 0.4 %
BILIRUB SERPL-MCNC: 0.2 MG/DL (ref 0.2–1.3)
BUN SERPL-MCNC: 13 MG/DL (ref 7–30)
CALCIUM SERPL-MCNC: 9.2 MG/DL (ref 8.5–10.1)
CHLORIDE SERPL-SCNC: 105 MMOL/L (ref 94–109)
CO2 SERPL-SCNC: 28 MMOL/L (ref 20–32)
CREAT SERPL-MCNC: 0.76 MG/DL (ref 0.66–1.25)
DEPRECATED CALCIDIOL+CALCIFEROL SERPL-MC: 24 UG/L (ref 20–75)
DIFFERENTIAL METHOD BLD: NORMAL
EOSINOPHIL # BLD AUTO: 0.2 10E9/L (ref 0–0.7)
EOSINOPHIL NFR BLD AUTO: 2.9 %
ERYTHROCYTE [DISTWIDTH] IN BLOOD BY AUTOMATED COUNT: 14.1 % (ref 10–15)
GFR SERPL CREATININE-BSD FRML MDRD: >90 ML/MIN/{1.73_M2}
GLUCOSE SERPL-MCNC: 98 MG/DL (ref 70–99)
HCT VFR BLD AUTO: 42.2 % (ref 40–53)
HGB BLD-MCNC: 14.1 G/DL (ref 13.3–17.7)
LYMPHOCYTES # BLD AUTO: 3.1 10E9/L (ref 0.8–5.3)
LYMPHOCYTES NFR BLD AUTO: 43 %
MCH RBC QN AUTO: 27.6 PG (ref 26.5–33)
MCHC RBC AUTO-ENTMCNC: 33.4 G/DL (ref 31.5–36.5)
MCV RBC AUTO: 83 FL (ref 78–100)
MONOCYTES # BLD AUTO: 0.8 10E9/L (ref 0–1.3)
MONOCYTES NFR BLD AUTO: 10.4 %
NEUTROPHILS # BLD AUTO: 3.1 10E9/L (ref 1.6–8.3)
NEUTROPHILS NFR BLD AUTO: 43.3 %
PLATELET # BLD AUTO: 250 10E9/L (ref 150–450)
POTASSIUM SERPL-SCNC: 4 MMOL/L (ref 3.4–5.3)
PROT SERPL-MCNC: 7.7 G/DL (ref 6.8–8.8)
RBC # BLD AUTO: 5.11 10E12/L (ref 4.4–5.9)
SODIUM SERPL-SCNC: 137 MMOL/L (ref 133–144)
TSH SERPL DL<=0.005 MIU/L-ACNC: 3.05 MU/L (ref 0.4–4)
WBC # BLD AUTO: 7.2 10E9/L (ref 4–11)

## 2019-09-12 PROCEDURE — 80053 COMPREHEN METABOLIC PANEL: CPT | Performed by: NURSE PRACTITIONER

## 2019-09-12 PROCEDURE — 90715 TDAP VACCINE 7 YRS/> IM: CPT | Performed by: NURSE PRACTITIONER

## 2019-09-12 PROCEDURE — 84443 ASSAY THYROID STIM HORMONE: CPT | Performed by: NURSE PRACTITIONER

## 2019-09-12 PROCEDURE — 90472 IMMUNIZATION ADMIN EACH ADD: CPT | Performed by: NURSE PRACTITIONER

## 2019-09-12 PROCEDURE — 90686 IIV4 VACC NO PRSV 0.5 ML IM: CPT | Performed by: NURSE PRACTITIONER

## 2019-09-12 PROCEDURE — 85025 COMPLETE CBC W/AUTO DIFF WBC: CPT | Performed by: NURSE PRACTITIONER

## 2019-09-12 PROCEDURE — 82306 VITAMIN D 25 HYDROXY: CPT | Performed by: NURSE PRACTITIONER

## 2019-09-12 PROCEDURE — 90471 IMMUNIZATION ADMIN: CPT | Performed by: NURSE PRACTITIONER

## 2019-09-12 PROCEDURE — 99214 OFFICE O/P EST MOD 30 MIN: CPT | Mod: 25 | Performed by: NURSE PRACTITIONER

## 2019-09-12 PROCEDURE — 36415 COLL VENOUS BLD VENIPUNCTURE: CPT | Performed by: NURSE PRACTITIONER

## 2019-09-12 ASSESSMENT — ENCOUNTER SYMPTOMS
HEADACHES: 0
NERVOUS/ANXIOUS: 1
NUMBNESS: 0
ABDOMINAL PAIN: 0
COUGH: 0
DIZZINESS: 0
DYSPHORIC MOOD: 1
SORE THROAT: 0
SHORTNESS OF BREATH: 0
RHINORRHEA: 0
DIARRHEA: 0
JOINT SWELLING: 0
DYSURIA: 0
PALPITATIONS: 0
WHEEZING: 0
FREQUENCY: 0
LIGHT-HEADEDNESS: 0
DECREASED CONCENTRATION: 1
FATIGUE: 0
SINUS PRESSURE: 0
SLEEP DISTURBANCE: 1
CONSTIPATION: 0
ARTHRALGIAS: 0

## 2019-09-12 ASSESSMENT — PATIENT HEALTH QUESTIONNAIRE - PHQ9
SUM OF ALL RESPONSES TO PHQ QUESTIONS 1-9: 18
5. POOR APPETITE OR OVEREATING: NOT AT ALL

## 2019-09-12 ASSESSMENT — ANXIETY QUESTIONNAIRES
6. BECOMING EASILY ANNOYED OR IRRITABLE: MORE THAN HALF THE DAYS
GAD7 TOTAL SCORE: 5
7. FEELING AFRAID AS IF SOMETHING AWFUL MIGHT HAPPEN: NOT AT ALL
1. FEELING NERVOUS, ANXIOUS, OR ON EDGE: NOT AT ALL
3. WORRYING TOO MUCH ABOUT DIFFERENT THINGS: SEVERAL DAYS
5. BEING SO RESTLESS THAT IT IS HARD TO SIT STILL: MORE THAN HALF THE DAYS
2. NOT BEING ABLE TO STOP OR CONTROL WORRYING: NOT AT ALL

## 2019-09-12 ASSESSMENT — PAIN SCALES - GENERAL: PAINLEVEL: NO PAIN (0)

## 2019-09-12 NOTE — LETTER
September 13, 2019      Nathan Nunez  [address] [facility] [address]        Dear ,    We are writing to inform you of your test results.    Your vitamin D level is on the lower end of normal.  I would recommend taking over-the-counter vitamin D 3 2000 units orally daily.    Your blood counts are all in normal range  Your electrolytes are all in normal range  Your kidney function is normal  Your liver function is normal  Your thyroid is in normal range  Your glucose (blood sugar) is in normal range     Resulted Orders   CBC with platelets differential   Result Value Ref Range    WBC 7.2 4.0 - 11.0 10e9/L    RBC Count 5.11 4.4 - 5.9 10e12/L    Hemoglobin 14.1 13.3 - 17.7 g/dL    Hematocrit 42.2 40.0 - 53.0 %    MCV 83 78 - 100 fl    MCH 27.6 26.5 - 33.0 pg    MCHC 33.4 31.5 - 36.5 g/dL    RDW 14.1 10.0 - 15.0 %    Platelet Count 250 150 - 450 10e9/L    % Neutrophils 43.3 %    % Lymphocytes 43.0 %    % Monocytes 10.4 %    % Eosinophils 2.9 %    % Basophils 0.4 %    Absolute Neutrophil 3.1 1.6 - 8.3 10e9/L    Absolute Lymphocytes 3.1 0.8 - 5.3 10e9/L    Absolute Monocytes 0.8 0.0 - 1.3 10e9/L    Absolute Eosinophils 0.2 0.0 - 0.7 10e9/L    Absolute Basophils 0.0 0.0 - 0.2 10e9/L    Diff Method Automated Method    Comprehensive metabolic panel   Result Value Ref Range    Sodium 137 133 - 144 mmol/L    Potassium 4.0 3.4 - 5.3 mmol/L    Chloride 105 94 - 109 mmol/L    Carbon Dioxide 28 20 - 32 mmol/L    Anion Gap 4 3 - 14 mmol/L    Glucose 98 70 - 99 mg/dL      Comment:      Fasting specimen    Urea Nitrogen 13 7 - 30 mg/dL    Creatinine 0.76 0.66 - 1.25 mg/dL    GFR Estimate >90 >60 mL/min/[1.73_m2]      Comment:      Non  GFR Calc  Starting 12/18/2018, serum creatinine based estimated GFR (eGFR) will be   calculated using the Chronic Kidney Disease Epidemiology Collaboration   (CKD-EPI) equation.      GFR Estimate If Black >90 >60 mL/min/[1.73_m2]      Comment:        American GFR Calc  Starting 12/18/2018, serum creatinine based estimated GFR (eGFR) will be   calculated using the Chronic Kidney Disease Epidemiology Collaboration   (CKD-EPI) equation.      Calcium 9.2 8.5 - 10.1 mg/dL    Bilirubin Total 0.2 0.2 - 1.3 mg/dL    Albumin 3.9 3.4 - 5.0 g/dL    Protein Total 7.7 6.8 - 8.8 g/dL    Alkaline Phosphatase 94 40 - 150 U/L    ALT 64 0 - 70 U/L    AST 30 0 - 45 U/L   TSH with free T4 reflex   Result Value Ref Range    TSH 3.05 0.40 - 4.00 mU/L   Vitamin D Deficiency   Result Value Ref Range    Vitamin D Deficiency screening 24 20 - 75 ug/L      Comment:      Season, race, dietary intake, and treatment affect the concentration of   25-hydroxy-Vitamin D. Values may decrease during winter months and increase   during summer months. Values 20-29 ug/L may indicate Vitamin D insufficiency   and values <20 ug/L may indicate Vitamin D deficiency.  Vitamin D determination is routinely performed by an immunoassay specific for   25 hydroxyvitamin D3.  If an individual is on vitamin D2 (ergocalciferol)   supplementation, please specify 25 OH vitamin D2 and D3 level determination by   LCMSMS test [id number].           If you have any questions or concerns, please call the clinic at the number listed above.       Sincerely,        TABBY Alejo CNP/get

## 2019-09-12 NOTE — PROGRESS NOTES
Subjective     Nathan Nunez is a 24 year old male who presents to clinic today for the following health issues:    HPI     Abnormal Mood Symptoms      Duration: almost 1 year    Description:  Depression: YES- feeling depressed, can't focus, forgetful  Anxiety: no   Panic attacks: no      Accompanying signs and symptoms: see PHQ-9 and ISMA scores    History (similar episodes/previous evaluation): None    Precipitating or alleviating factors: None    Therapies tried and outcome: therapy is helping a little                                                                                                      Some-                                                                        Never   Rarely      times       Often  Very Often  1. How often do you have trouble wrapping up the final details of a project,  once the challenging parts have been done?    x    2. How often do you have difficulty getting things in order when you have to do a task that requires organization?    x    3. How often do you have problems remembering appointments or obligations?    x    4. When you have a task that requires a lot of thought, how often do you avoid or delay getting started?     x   5. How often do you fidget or squirm with your hands or feet when you have to sit down for a long time?    x    6. How often do you feel overly active and compelled to do things, like you were driven by a motor?   x       Part A                                                        7. How often do you make careless mistakes when you have to work on a boring or difficult project?     x   8. How often do you have difficulty keeping your attention when you are doing boring or repetitive work?     x   9. How often do you have difficulty concentrating on what people say to you, even when they are speaking to you directly?     x   10. How often do you misplace or have difficulty finding things at home or at work?     x   11. How often are you  distracted by activity or noise around you?     x   12. How often do you leave your seat in meetings or other situations in which you are expected to remain seated?     x     13. How often do you feel restless or fidgety?       x   14. How often do you have difficulty unwinding and relaxing when you have time to yourself?  x      15. How often do you find yourself talking too much when you are in social situations?     x   16. When you're in a conversation, how often do you find yourself finishing the sentences of the people you are talking to, before they can finish them themselves?     x   17. How often do you have difficulty waiting your turn in situations when turn-taking is required?     x   18. How often do you interrupt others when they are busy?     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 Works at [employer]. Has been going to therapy for the last year. Is going to couples couseling with wife next week. Feels like cant focus on anything at work. Did lose previous job, was there for 1.5 years. Was not able to hit numbers due to inablity to focus. Is more forgeful than had been. Forgets appts. During school, would never turn assignments in. Really got bad in middle school and then even worse in HS. Able to testing okay. Does have some trouble sleeping. Will have other times that is hard to stay asleep. Does not know what is waking up. Wife works from home and takes care of daugher. If needs someone to take care of daugher while wife is working mom will come over.       No current outpatient medications on file.     No Known Allergies    Reviewed and updated as needed this visit by Provider               Objective    /80 (BP Location: Left arm, Patient Position: Sitting, Cuff Size: Adult Large)   Pulse 72   Temp 96.2  F (35.7  C) (Tympanic)   Resp 20   Wt 131.3 kg (289 lb 6.4 oz)   BMI 39.25 kg/m    Body mass index is 39.25 kg/m .          Assessment & Plan      Review of Systems   Constitutional: Negative  for fatigue.   HENT: Negative for congestion, ear pain, rhinorrhea, sinus pressure and sore throat.    Respiratory: Negative for cough, shortness of breath and wheezing.    Cardiovascular: Negative for chest pain, palpitations and leg swelling.   Gastrointestinal: Negative for abdominal pain, constipation and diarrhea.   Genitourinary: Negative for dysuria and frequency.   Musculoskeletal: Negative for arthralgias and joint swelling.   Skin: Negative for rash.   Neurological: Negative for dizziness, light-headedness, numbness and headaches.        Forgetful     Psychiatric/Behavioral: Positive for decreased concentration, dysphoric mood and sleep disturbance. Negative for suicidal ideas. The patient is nervous/anxious.        Physical Exam   Constitutional: He appears well-developed and well-nourished.   HENT:   Right Ear: Tympanic membrane and external ear normal.   Left Ear: Tympanic membrane and external ear normal.   Mouth/Throat: Uvula is midline, oropharynx is clear and moist and mucous membranes are normal.   Neck: Carotid bruit is not present. No thyromegaly present.   Cardiovascular: Normal rate, regular rhythm and normal heart sounds.   Pulmonary/Chest: Effort normal and breath sounds normal.   Abdominal: Soft. Bowel sounds are normal.   Musculoskeletal: He exhibits no edema.   Neurological: He is alert.   Skin: Skin is warm and dry.   Psychiatric: He has a normal mood and affect.         1. Concentration deficit  We will check basic labs here today.  Will refer to mental health for further evaluation  - CBC with platelets differential  - Comprehensive metabolic panel  - TSH with free T4 reflex  - Vitamin D Deficiency  - MENTAL HEALTH REFERRAL  - Adult; Assessments and Testing; ADHD; Other: [facility] [phone]; We will contact you to schedule the appointment or please call with any questions    2. Forgetfulness  Likely secondary to depression or attention deficit.  We will  continue to monitor  - TSH with free T4 reflex  - Vitamin D Deficiency    3. Sleep disturbance  Likely secondary to above.  Works night shift.  Will plan to continue to monitor in the future.  May need sleep study.    4. Need for influenza vaccination  Administered today in clinic  - HC FLU VAC PRESRV FREE QUAD SPLIT VIR 3+YRS IM  [[id number]]  -      ADMIN VACCINE, ADDL [[id number]]    5. Need for Tdap vaccination  Administered today in clinic  - TDAP VACCINE (ADACEL)  -      ADMIN VACCINE, FIRST       No follow-ups on file.    TABBY Alejo [facility]

## 2019-09-13 ENCOUNTER — ALLIED HEALTH/NURSE VISIT (OUTPATIENT)
Dept: NURSING | Facility: CLINIC | Age: 24
End: 2019-09-13
Payer: MEDICAID

## 2019-09-13 DIAGNOSIS — R41.840 CONCENTRATION DEFICIT: Primary | ICD-10-CM

## 2019-09-13 DIAGNOSIS — Z71.9 COUNSELED BY NURSE: Primary | ICD-10-CM

## 2019-09-13 DIAGNOSIS — R68.89 FORGETFULNESS: ICD-10-CM

## 2019-09-13 PROCEDURE — 99207 ZZC NO CHARGE NURSE ONLY: CPT

## 2019-09-13 ASSESSMENT — ANXIETY QUESTIONNAIRES: GAD7 TOTAL SCORE: 5

## 2019-09-13 NOTE — PROGRESS NOTES
Pt walked in with form for disability insurance form that needs to be filled out and signed by provider by Sunday, 9/15/19.  Pt needs to have this completed by the provider who cleared him to return to work, which was Dr. Sousa at [facility] on 7/12/19. Recommended he bring the form to [facility] for completion. Dr. Sousa out of clinic and does not return until Monday, 9/16/19. Advised pt he contact the number on the form and inform them the provider that cleared him to return to work is unavailable until Monday, 9/16/19. His wife is having surgery on Monday, so he may drop of the form yet today.      He agreed with this plan and a copy of the 7/12/19 letter provided to pt.     Radha ESCOBAR RN

## 2019-09-17 ENCOUNTER — TELEPHONE (OUTPATIENT)
Dept: FAMILY MEDICINE | Facility: CLINIC | Age: 24
End: 2019-09-17

## 2019-09-17 NOTE — TELEPHONE ENCOUNTER
[place] form placed in  basket.    Mariaa Zheng on 9/17/2019 at 8:39 AM

## 2019-09-19 NOTE — TELEPHONE ENCOUNTER
Forms completed and faxed to [phone] mailed to him also.  Mariaa Zheng on 9/19/2019 at 8:05 AM

## 2019-09-20 ENCOUNTER — OFFICE VISIT (OUTPATIENT)
Dept: PSYCHIATRY | Facility: CLINIC | Age: 24
End: 2019-09-20
Attending: NURSE PRACTITIONER
Payer: MEDICAID

## 2019-09-20 VITALS
DIASTOLIC BLOOD PRESSURE: 78 MMHG | WEIGHT: 289 LBS | HEIGHT: 72 IN | OXYGEN SATURATION: 96 % | HEART RATE: 83 BPM | BODY MASS INDEX: 39.14 KG/M2 | RESPIRATION RATE: 12 BRPM | SYSTOLIC BLOOD PRESSURE: 124 MMHG

## 2019-09-20 DIAGNOSIS — F32.1 MODERATE MAJOR DEPRESSION (H): Primary | ICD-10-CM

## 2019-09-20 DIAGNOSIS — F90.9 ATTENTION DEFICIT HYPERACTIVITY DISORDER (ADHD), UNSPECIFIED ADHD TYPE: ICD-10-CM

## 2019-09-20 PROCEDURE — 90792 PSYCH DIAG EVAL W/MED SRVCS: CPT | Performed by: NURSE PRACTITIONER

## 2019-09-20 RX ORDER — BUPROPION HYDROCHLORIDE 150 MG/1
150 TABLET ORAL EVERY MORNING
Qty: 30 TABLET | Refills: 0 | Status: SHIPPED | OUTPATIENT
Start: 2019-09-20 | End: 2019-10-02

## 2019-09-20 ASSESSMENT — ANXIETY QUESTIONNAIRES
3. WORRYING TOO MUCH ABOUT DIFFERENT THINGS: NOT AT ALL
6. BECOMING EASILY ANNOYED OR IRRITABLE: MORE THAN HALF THE DAYS
IF YOU CHECKED OFF ANY PROBLEMS ON THIS QUESTIONNAIRE, HOW DIFFICULT HAVE THESE PROBLEMS MADE IT FOR YOU TO DO YOUR WORK, TAKE CARE OF THINGS AT HOME, OR GET ALONG WITH OTHER PEOPLE: VERY DIFFICULT
2. NOT BEING ABLE TO STOP OR CONTROL WORRYING: SEVERAL DAYS
1. FEELING NERVOUS, ANXIOUS, OR ON EDGE: NOT AT ALL
GAD7 TOTAL SCORE: 9
7. FEELING AFRAID AS IF SOMETHING AWFUL MIGHT HAPPEN: NOT AT ALL
5. BEING SO RESTLESS THAT IT IS HARD TO SIT STILL: NEARLY EVERY DAY

## 2019-09-20 ASSESSMENT — MIFFLIN-ST. JEOR: SCORE: 2338.9

## 2019-09-20 ASSESSMENT — PATIENT HEALTH QUESTIONNAIRE - PHQ9
SUM OF ALL RESPONSES TO PHQ QUESTIONS 1-9: 16
5. POOR APPETITE OR OVEREATING: NEARLY EVERY DAY

## 2019-09-20 NOTE — Clinical Note
RAYNA Quesada has been referred for psychological testing to confirm ADHD diagnosis.  In the mean time he will start Wellbutrin.  Thank you -

## 2019-09-20 NOTE — PATIENT INSTRUCTIONS
1. Wellbutrin XL one tab (150  Mg) daily for depression and focus - in 2 weeks contact me to increase the dose if you feel it would help.  2. ADHD testing with Darren Washburn   3. Continue couples therapy         Continue all other medical directions per primary care provider.     Continue all other medications as reviewed per electronic medical record today.     Safety plan reviewed. To the Emergency Department as needed or call after hours crisis line at [phone] or [phone]. [place] Crisis Text Line: Text [place] to 883265  or  Suicide LifeLine Chat: suicideHolidayGang.com.org/chat/    To schedule individual or family therapy, call [facility] at [phone].     Continue individual therapy as planned with .    Schedule an appointment with me in 4 weeks or sooner as needed.  Call [facility] at [phone] to schedule.    Follow up with primary care provider as planned or for acute medical concerns.    Call the psychiatric nurse line with medication questions or concerns at [phone].    [employer] may be used to communicate with your provider, but this is not intended to be used for emergencies.    Crisis Resources:    National Suicide Prevention Lifeline: 336.740.7655 (TTY: 652.581.6136). Call anytime for help.  (www.suicidepreventionlifeline.org)  National [place] on Mental Illness (www.leni.org): 610.329.8781 or 882-758-8783.   Mental Health Association (www.mentalhealth.org): 787.927.9755 or 972-043-6081.  [place] Crisis Text Line: Text [place] to 724548  Suicide LifeLine Chat: suicideHolidayGang.com.org/chat

## 2019-09-20 NOTE — PROGRESS NOTES
"                                                         Outpatient Psychiatric Evaluation - Standard Adult    Name:  Nathan Nunez  : 1995    Source of Referral:  Primary Care Provider: TABBY Herr   Last visit: 2019  Current Psychotherapist: Jennifer Wolfe   Last visit: Unknown    Identifying Data:  Patient is a 24 year old,   White Choose not to answer male  who presents for initial visit with me.  Patient is currently employed full time. Patient attended the session alone. Consent to communicate signed for  no-one. Consent for treatment signed and included in electronic medical record. Discussed limits of confidentiality today. My Practice Policy was reviewed and signed.     Patient prefers to be called: Dipak     Chief Complaint:    Chief Complaint   Patient presents with     Consult     Pt is here to talk about mental health and starting a medication. He has never taken medications for mental health in the past.       Patient reports: \"I have trouble concentrating\"    HPI:      Dipak is a 24-year-old male, fluent in English,  who comes in today to talk about his difficulties being able to focus and concentrate.  He tells me he has noticed this for \"a while\".  He finds it difficult to get motivated completing tasks around the house and meeting his performance \"is at work.  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  He has been doing this for about a month and tells me he does not like his job.  He has not been meeting his quotas yet as it is difficult to focus and be motivated to keep his work pace up.  When this happens he tells me he has racing thoughts and then stops what he is doing to think about other things which makes it difficult to concentrate.  He has had episodes of irritability.  Sometimes people tell him to slow down when he talks too fast.  He sometimes spends money on things that he does not need but denies that it is any large " "sum of money.  Dipak would like to be able to go to  school but is hesitant due to his inabilities to focus and concentrate.  Sometimes he frequently checks locks at the end of the day in his house to make sure they are bolted.  He tells me he has to eat foods in a certain way.  For example he has to eat apples in a certain direction.  Dipak denies any legal history except receiving 9 speeding tickets.  He tells me this is due to being distracted with his thoughts while driving.  Sometimes it is difficult for Dipak to fall asleep but it is particularly hard for him to get up in the morning.  He says this is due to low motivation to get things done.  She also admits to taking in a lot of caffeine.  He maintains that caffeine pills \"keep me alert\".  However he does report some heart palpitations that worsen when he sneezes or coughs.  He tells me that he drinks very little alcohol, at the most a beer a week with his father-in-law.  Dipak is in couples therapy with his wife whom he has been together with for 19 years but  for 2 years.  He was hesitant in discussing how he had been texting other girls but denied ever engaging in any intimate relationships with them.  Him and his wife are also working on his spending habits.  He tells me he feels very little anxiety.  He has no financial stress to speak of.    Dipak has a daughter who is almost 2 years old who was born premature at 24 weeks.  She is doing well now but had multiple medical issues in her recovery.  He now has a son that is due any day with his wife.  This pregnancy is also considered high risk.     Dipak was born in [place], lived in [place] until second grade then moved to [facility].  Him and his family settled in [facility].  His mother was forced to retire early from her work due to back surgeries his dad had a difficult time keeping jobs Dipak reports no concerns regarding his upbringing but he did report some depression in the eighth grade.  " He does not remember what that was from.  He tells me him and his dad prayed and then he got better.  Diapk denies being bullied and had childhood friends whom he still stays in contact with.  He had difficulties completing homework in school but was able to pass his exams.  He remembers teachers telling him to pay attention and stop talking throughout his school years.  When his annita year he switched to online high school courses.  He tried college but was unable to finish the work due to having difficulties paying attention.        I explained my practice philosophy as referring people to psychology for testing for ADHD before prescribing any medication specifically designed for that condition.  He verbalized understanding of this and is agreeable to the plan as outlined below.       Psychiatric Review of Symptoms:  Depression: Interest: Decrease  Sleep: Increase   Energy: Decrease  Concentration: Decrease  Suicide: No  Irritability: Increase    PHQ-9 scores: PHQ-9 SCORE 9/12/2019 9/20/2019   PHQ-9 Total Score 18 16     Leora:  Distractibility: Increase  Impulsiveness: Increase  Racing Thoughts: Increase  Pressured Speech: Increase  Irritability   MDQ Score: Positive Screen  Anxiety: Denies symptoms    ISMA-7 scores:  ISMA-7 SCORE 9/12/2019 9/20/2019   Total Score 5 9     Panic:  Palpitations   Agoraphobia:  No   PTSD:  No symptoms   OCD:  Symmetry  Does not interfere with functioning   Psychosis: No symptoms   ADD / ADHD: Attention Problem(s)  Problems with Listening  Task Completion Difficulties  Distractible  Gambling or shoplifting: No   Eating Disorder:  No symptoms  Sleep:   Hypersomnolence     Psychiatric History:   Hospitalizations: None  History of Commitment? No   Past Treatment: None  Suicide Attempts: No   Self-injurious Behavior: Denies  Electroconvulsive Therapy (ECT) or Transcranial Magnetic Stimulation (TMS): No   Genetic Testing: No     Substance Use History:  Current use of drugs or alcohol:  Alcohol  and 1 beer per week  Patient reports no problems as a result of their drinking / drug use.   Based on the clinical interview, there  are not indications of drug or alcohol abuse.  Tobacco use: No  Caffeine:  Yes multiple cups of coffee and caffeine pills daily  Patient has not received chemical dependency treatment in the past  Recovery Programming Involvement: None    Past Medical History:  History reviewed. No pertinent past medical history.   Surgery: History reviewed. No pertinent surgical history.  Allergies:   No Known Allergies  Primary Care Provider: Nestor Shultz DO  Seizures or Head Injury: No  Diet: No Restrictions  Food Allergies: No   Exercise: No regular exercise program  Supplements: Reviewed per Electronic Medical Record Today      No current outpatient medications on file prior to visit.  No current facility-administered medications on file prior to visit.      The [place] Prescription Monitoring Program has been reviewed and there is no  data for controlled substances over the last one year.     Vital Signs:  Vitals: /78 (BP Location: Left arm, Patient Position: Chair, Cuff Size: Adult Large)   Pulse 83   Resp 12   Ht 1.829 m (6')   Wt 131.1 kg (289 lb)   SpO2 96%   BMI 39.20 kg/m      Labs:  Most recent laboratory results reviewed and the pertinent results include: All WNL  No EKG on file.    Review of Systems:  10 systems (general, cardiovascular, respiratory, eyes, ENT, endocrine, GI, , M/S, neurological) were reviewed. Most pertinent finding(s) is/are: No rash, no chest pains. The remaining systems are all unremarkable.  Family History:   Patient reported family history includes: Family History   Problem Relation Age of Onset     Cancer Mother 56        breast cancer     Mental Illness History: Denies  Substance Abuse History: Denies  Suicide History: Denies  Medications: Denies     Social History:   Birth place: [place], [place]  Childhood: Yes intact home  Siblings:   zero Brother(s),  two Sister(s) Middle in [place]  Highest education level was high school graduate.   Employment History: Currently working as a  for [employer]  Childhood illnesses: Pyloric stenosis with surgery  Current Living situation:  [place] with Spouse/Partner, Son and Daughter . Feels safe at home.  Children: two   Firearms/Weapons Access: No: Patient denies   Service: No    Mental Status Examination:     Appearance:  casually dressed  Attitude:  cooperative   Eye Contact:  good  Gait and Station: Normal  Psychomotor Behavior:  intact station, gait and muscle tone  Oriented to:  time, person, and place  Attention Span and Concentration:  Fair  Speech:  clear, coherent, fluent and Speaks English  Mood:  good  Affect:  mood incongruent  Associations:  no loose associations  Thought Process:  logical  Thought Content:  no evidence of suicidal ideation or homicidal ideation, no auditory hallucinations present and no visual hallucinations present  Recent and Remote Memory:  intact Not formally assessed. No amnesia.  Fund of Knowledge: appropriate  Insight:  good  Judgment:  intact  Impulse Control:  fair    Suicide Risk Assessment:  Today Nathan Nunez reports denies thoughts to harm himself or other people. In addition, there are notable risk factors for self-harm, including None apparent at this time. However, risk is mitigated by commitment to family, spiritual/Adventism beliefs, sobriety, absence of past attempts, ability to volunteer a safety plan, future oriented, no access to firearms or weapons, denies suicidal intent or plan and denies homicidal ideation, intent, or plan. Therefore, based on all available evidence including the factors cited above, Nathan Nunez does not appear to be at imminent risk for self-harm, does not meet criteria for a 72-hr hold, and therefore remains appropriate for ongoing outpatient level of care.  A thorough assessment of  risk factors related to suicide and self-harm have been reviewed and are noted above. The patient convincingly denies acute suicidality on several occasions. Local community safety resources reviewed and printed for patient to use if needed. There was no deceit detected, and the patient presented in a manner that was believable.     DSM5  Diagnosis:  296.22 (F32.1)  Major Depressive Disorder, Single Episode, Moderate _    Medical Comorbidities Include:   Patient Active Problem List    Diagnosis Date Noted     Acute pain of right shoulder 06/06/2019     Priority: Medium       A 12-item WHODAS 2.0 assessment was completed by the patient today and recorded in EPIC.    WHODAS 2.0 Total Score 9/20/2019   Total Score 24       The Patient Activation Measure (SEGUNDO) score was completed and recorded in Myla. This assesses patient knowledge, skill, and confidence for self-management. No flowsheet data found.             Impression:  Nathan Nunez is a 24-year-old male who comes in today requesting a review of his symptoms of lack of focus and concentration, motivation, and depression.  He tells me he described his symptoms to appear who was taking Adderall for ADHD symptoms, and he thinks that he might be a candidate for medication to help him better focus and concentrate at home and at work.  He understands the policy of receiving psychological testing.  Information was given to him today from our database regarding adult ADHD symptoms and treatment as information for him to explore further.  I also referred him for a psychological assessment to confirm his diagnosis.  Dipak has not received any type of psychiatric care in the past nor has he been on any medications for mental health symptoms.  Today he is agreeable to a trial of Wellbutrin to help increase his motivation and energy.  It is also off label indicated for some symptoms of concentration and focus.  We will start at minimum dose with thoughts to increase  it after 2 weeks if he is not feeling any different.  Dipak will continue with couples therapy with his wife as they build the trust between them through developing coping skills to foster the relationship positively. Medication side effects and alternatives reviewed. Health promotion activities recommended and reviewed today. All questions addressed. Education and counseling completed regarding risks and benefits of medications and psychotherapy options. Collaborative Care Psychiatry Service model reviewed today. Recommend continued therapy for additional support.     Treatment Plan:    1. Wellbutrin XL one tab (150  Mg) daily for depression and focus - in 2 weeks contact me to increase the dose if you feel it would help.  2. ADHD testing with Darren Washburn   3. Continue couples therapy       Continue all other medical directions per primary care provider.     Continue all other medications as reviewed per electronic medical record today.     Safety plan reviewed. To the Emergency Department as needed or call after hours crisis line at [phone] or [phone]. [place] Crisis Text Line: Text [place] to [address]82  or  Suicide LifeLine Chat: suicidepreGlobal Green Capitals Corporationline.org/chat/    To schedule individual or family therapy, call [facility] at [phone].     Continue individual therapy as planned with your couples counselor.    Schedule an appointment with me in 4 weeks or sooner as needed.  Call [facility] at [phone] to schedule.    Follow up with primary care provider as planned or for acute medical concerns.    Call the psychiatric nurse line with medication questions or concerns at [phone].    [employer] may be used to communicate with your provider, but this is not intended to be used for emergencies.    Crisis Resources:    National Suicide Prevention Lifeline: 355.225.7631 (TTY: 816.840.3493). Call anytime for help.  (www.suicidepreventionlifeline.org)  National [place] on  Mental Illness (www.leni.org): 263-852-5712 or 269-566-3579.   Mental Health Association (www.mentalhealth.org): 929.145.4934 or 449-642-2635.  [place] Crisis Text Line: Text [place] to 317850  Suicide LifeLine Chat: suicidepreKeen Systemsline.org/chat    Administrative Billing:   Time spent with patient was 60 minutes and greater than 50% of time or 40 minutes was spent in counseling and coordination of care regarding above diagnoses and treatment plan.    Patient Status:  Patient will continue to be seen for ongoing consultation and stabilization.    Signed:   TIRSO Garcia-BC   Psychiatry

## 2019-09-21 ASSESSMENT — ANXIETY QUESTIONNAIRES: GAD7 TOTAL SCORE: 9

## 2019-09-24 ENCOUNTER — OFFICE VISIT (OUTPATIENT)
Dept: FAMILY MEDICINE | Facility: CLINIC | Age: 24
End: 2019-09-24
Payer: OTHER MISCELLANEOUS

## 2019-09-24 VITALS
HEIGHT: 72 IN | WEIGHT: 286.4 LBS | DIASTOLIC BLOOD PRESSURE: 72 MMHG | SYSTOLIC BLOOD PRESSURE: 124 MMHG | RESPIRATION RATE: 14 BRPM | TEMPERATURE: 97.8 F | BODY MASS INDEX: 38.79 KG/M2 | HEART RATE: 98 BPM | OXYGEN SATURATION: 96 %

## 2019-09-24 DIAGNOSIS — S69.92XA HAND INJURY, LEFT, INITIAL ENCOUNTER: Primary | ICD-10-CM

## 2019-09-24 DIAGNOSIS — Z02.6 ENCOUNTER RELATED TO WORKER'S COMPENSATION CLAIM: ICD-10-CM

## 2019-09-24 PROCEDURE — 99213 OFFICE O/P EST LOW 20 MIN: CPT | Performed by: PHYSICIAN ASSISTANT

## 2019-09-24 ASSESSMENT — MIFFLIN-ST. JEOR: SCORE: 2327.1

## 2019-09-24 NOTE — PROGRESS NOTES
Subjective     Nathan Nunez is a 24 year old male who presents to clinic today for the following health issues:    HPI   Joint Pain    Onset: 9/19/19    Description:   Location: left hand  Character: Dull ache    Intensity: moderate    Progression of Symptoms: intermittent    Accompanying Signs & Symptoms:  Other symptoms: can't  as tight    History:   Previous similar pain: no       Precipitating factors:   Trauma or overuse: YES, injured at work. 27 pound case of hash browns fell on hand from 3ft    Alleviating factors:  Improved by: ice and biofreeze    Therapies Tried and outcome: ice and biofreeze, helps for a while but soreness comes back    Patient was at work on 9/19 and was taking a box of frozen hash browns off a top shelf and the case fell on top of the left hand. He has had pain with use and some decrease in  strength since that time  -------------------------------------    BP Readings from Last 3 Encounters:   09/24/19 124/72   09/20/19 124/78   09/12/19 104/80    Wt Readings from Last 3 Encounters:   09/24/19 129.9 kg (286 lb 6.4 oz)   09/20/19 131.1 kg (289 lb)   09/12/19 131.3 kg (289 lb 6.4 oz)                    Reviewed and updated as needed this visit by Provider         Review of Systems   ROS COMP: Constitutional, HEENT, cardiovascular, pulmonary, gi and gu systems are negative, except as otherwise noted.      Objective    /72   Pulse 98   Temp 97.8  F (36.6  C) (Oral)   Resp 14   Ht 1.829 m (6')   Wt 129.9 kg (286 lb 6.4 oz)   SpO2 96%   BMI 38.84 kg/m    Body mass index is 38.84 kg/m .  Physical Exam   GENERAL: healthy, alert and no distress  EYES: Eyes grossly normal to inspection  NECK: no adenopathy, no asymmetry, masses, or scars and thyroid normal to palpation  RESP: lungs clear to auscultation - no rales, rhonchi or wheezes  CV: regular rates and rhythm  MS: tenderness to the dorsal left hand, no bruising or deformity, no decrease ROM, no bony  tenderness.  mild decreased  strength in comparison to right hand  SKIN: no suspicious lesions or rashes    Diagnostic Test Results:  none         Assessment & Plan       ICD-10-CM    1. Hand injury, left, initial encounter S69.92XA    2. Encounter related to worker's compensation claim [email or web address]         BMI:   Estimated body mass index is 38.84 kg/m  as calculated from the following:    Height as of this encounter: 1.829 m (6').    Weight as of this encounter: 129.9 kg (286 lb 6.4 oz).   Weight management plan: Patient was referred to their PCP to discuss a diet and exercise plan.        I will have patient use ice and ibuprofen and if symptoms are worsening or persisting would have him follow up for further evaluation with xray   See Patient Instructions    Return in about 1 week (around 10/1/2019), or if symptoms worsen or fail to improve.    Rosey Frank PA-C  [facility]

## 2019-09-24 NOTE — LETTER
[address]  [place] [address]  Phone: [phone]    September 24, 2019        Nathan Nunez  [address] [facility] [address]          To whom it may concern:    RE: Nathan Nunez    Patient was seen and treated today at our clinic.  He sustained an injury to the left hand while working on 9/19 and has residual pain and decreased  strength in that hand. He may be a little slower in his usual job for the next week due to this but does not require specific weight restrictions.     Please contact me for questions or concerns.      Sincerely,        Rosey Frank PA-C

## 2019-09-24 NOTE — PATIENT INSTRUCTIONS
I will have you use ice a couple times a day and use ibuprofen 600 mg 3 x daily with food for 7-10 days to help with inflammation. If pain is persisting or worsening over the next 7-10 days follow up for further evaluation with xray.

## 2019-09-27 PROBLEM — M25.511 ACUTE PAIN OF RIGHT SHOULDER: Status: RESOLVED | Noted: 2019-06-06 | Resolved: 2019-09-27

## 2019-10-01 ENCOUNTER — TELEPHONE (OUTPATIENT)
Dept: PSYCHIATRY | Facility: CLINIC | Age: 24
End: 2019-10-01

## 2019-10-01 DIAGNOSIS — F32.1 MODERATE MAJOR DEPRESSION (H): ICD-10-CM

## 2019-10-01 NOTE — TELEPHONE ENCOUNTER
Last treatment plan from 9/20/19 reviewed and summarized below :   Treatment Plan:     1. Wellbutrin XL one tab (150  Mg) daily for depression and focus - in 2 weeks contact me to increase the dose if you feel it would help.  2. ADHD testing with Darren Washburn   3. Continue couples therapy         Routing to provider to get dose and sig for increase in Wellbutrin.

## 2019-10-01 NOTE — TELEPHONE ENCOUNTER
Reason for call: been 2 weeks and been taking 150 mg of presc med with no benefit/change    Phone number to reach patient:  [phone]    Best Time:  any  Can we leave a detailed message on this number? YES

## 2019-10-02 RX ORDER — BUPROPION HYDROCHLORIDE 300 MG/1
300 TABLET ORAL EVERY MORNING
Qty: 30 TABLET | Refills: 0 | Status: SHIPPED | OUTPATIENT
Start: 2019-10-02 | End: 2019-10-21

## 2019-10-02 NOTE — TELEPHONE ENCOUNTER
From provider's routing comment:  Keysha Bashir, Analisa Parker RN; P Psych Rn - [facility]   Caller: Unspecified (Yesterday,  3:06 PM)             Increase to 300 mg daily of Wellbutrin

## 2019-10-21 ENCOUNTER — OFFICE VISIT (OUTPATIENT)
Dept: PSYCHIATRY | Facility: CLINIC | Age: 24
End: 2019-10-21
Payer: COMMERCIAL

## 2019-10-21 VITALS
OXYGEN SATURATION: 95 % | WEIGHT: 278.1 LBS | DIASTOLIC BLOOD PRESSURE: 82 MMHG | RESPIRATION RATE: 20 BRPM | HEIGHT: 72 IN | TEMPERATURE: 98 F | SYSTOLIC BLOOD PRESSURE: 112 MMHG | BODY MASS INDEX: 37.67 KG/M2 | HEART RATE: 103 BPM

## 2019-10-21 DIAGNOSIS — F32.1 MODERATE MAJOR DEPRESSION (H): ICD-10-CM

## 2019-10-21 PROCEDURE — 99214 OFFICE O/P EST MOD 30 MIN: CPT | Performed by: NURSE PRACTITIONER

## 2019-10-21 RX ORDER — BUPROPION HYDROCHLORIDE 150 MG/1
150 TABLET ORAL EVERY MORNING
Qty: 30 TABLET | Refills: 3 | Status: SHIPPED | OUTPATIENT
Start: 2019-10-21 | End: 2020-01-31

## 2019-10-21 RX ORDER — BUPROPION HYDROCHLORIDE 300 MG/1
300 TABLET ORAL EVERY MORNING
Qty: 30 TABLET | Refills: 3 | Status: SHIPPED | OUTPATIENT
Start: 2019-10-21 | End: 2020-01-31

## 2019-10-21 ASSESSMENT — ANXIETY QUESTIONNAIRES
5. BEING SO RESTLESS THAT IT IS HARD TO SIT STILL: SEVERAL DAYS
7. FEELING AFRAID AS IF SOMETHING AWFUL MIGHT HAPPEN: NOT AT ALL
1. FEELING NERVOUS, ANXIOUS, OR ON EDGE: SEVERAL DAYS
IF YOU CHECKED OFF ANY PROBLEMS ON THIS QUESTIONNAIRE, HOW DIFFICULT HAVE THESE PROBLEMS MADE IT FOR YOU TO DO YOUR WORK, TAKE CARE OF THINGS AT HOME, OR GET ALONG WITH OTHER PEOPLE: NOT DIFFICULT AT ALL
3. WORRYING TOO MUCH ABOUT DIFFERENT THINGS: NOT AT ALL
6. BECOMING EASILY ANNOYED OR IRRITABLE: MORE THAN HALF THE DAYS
GAD7 TOTAL SCORE: 5
2. NOT BEING ABLE TO STOP OR CONTROL WORRYING: NOT AT ALL

## 2019-10-21 ASSESSMENT — PATIENT HEALTH QUESTIONNAIRE - PHQ9
SUM OF ALL RESPONSES TO PHQ QUESTIONS 1-9: 15
5. POOR APPETITE OR OVEREATING: SEVERAL DAYS

## 2019-10-21 ASSESSMENT — MIFFLIN-ST. JEOR: SCORE: 2289.45

## 2019-10-21 NOTE — PATIENT INSTRUCTIONS
1. Increase Wellbutrin to 450 mg daily  2. Complete Psychological testing for diagnosis clarification.         Continue all other medications as reviewed per electronic medical record today.     Safety plan reviewed. To the Emergency Department as needed or call after hours crisis line at [phone] or [phone]. [place] Crisis Text Line. Text [place] to 358004 or Suicide LifeLine Chat: suicidepreventionlifeline.org/chat/    To schedule individual or family therapy, call [facility] at [phone].     Schedule an appointment with me in 3 months or sooner as needed. Call [facility] at [phone] to schedule.    Follow up with primary care provider as planned or for acute medical concerns.    Call the psychiatric nurse line with medication questions or concerns at [phone].    [employer] Networks[employer] may be used to communicate with your provider, but this is not intended to be used for emergencies.

## 2019-10-21 NOTE — PROGRESS NOTES
"    Outpatient Psychiatric Progress Note    Name: Nathan Nunez   : 1995                    Primary Care Provider: Nestor Shultz DO   Therapist: None    PHQ-9 scores:  PHQ-9 SCORE 2019 2019 10/21/2019   PHQ-9 Total Score 18 16 15       ISMA-7 scores:  ISMA-7 SCORE 2019 2019 10/21/2019   Total Score 5 9 5       Patient Identification:    Patient is a 24 year old year old,   White Choose not to answer male  who presents for return visit with me.  Patient is currently employed full time. Patient attended the session alone. Patient prefers to be called: \"Dipak\".    Interim History:    I last saw Nathan Nunez for outpatient psychiatry Consultation on 2019.     During that appointment, we reviewed his mental health history as it pertains to depression, anxiety, and his suspicions of having ADHD.  Testing referral was made for diagnosis clarification.  In the meantime to help with symptoms of depression he agreed to start Wellbutrin  mg daily.  At this point he was denying any suicide thinking.  His wife was about to give birth to their son at any moment.    Current medications include: No current outpatient medications on file prior to visit.  No current facility-administered medications on file prior to visit.        The [place] Prescription Monitoring Program has been reviewed and there are no concerns about diversionary activity for controlled substances at this time.      I was able to review most recent Primary Care Provider, specialty provider, and therapy visit notes that I have access to.     Today, patient reports that he has an appointment scheduled for ADHD testing at the end of November or beginning of December through [facility] [place].  It was shortly after starting the 150 mg of Wellbutrin that he called in to request an increase.  With the dose now at 300 mg daily he tells me he is having less depression.  He is more " motivated to get things done at home.  Earlier this month his son was born and he was able to take leave from work for 1 week.  He continues to work 12-hour shifts during the night and hopes to get a different job to be able to be available for his family more.  With regards to symptoms of ADHD he tells me he is still having troubles focusing at work.  He has to listen to music and ear buds in order to get things done.  Sometimes he forgets what he supposed to do at work.  He needs frequent reminders up to 15-20 times daily to follow through with projects expected of him.     has no past medical history on file.    Social history updates:    Dipak cares for his wife, his 2-year-old daughter, and his  son.    Substance use updates:    Denies alcohol use  Tobacco use: No    Vital Signs:   /82 (BP Location: Left arm, Patient Position: Chair, Cuff Size: Adult Large)   Pulse 103   Temp 98  F (36.7  C) (Tympanic)   Resp 20   Ht 1.829 m (6')   Wt 126.1 kg (278 lb 1.6 oz)   SpO2 95%   BMI 37.72 kg/m      Labs:    Most recent laboratory results reviewed and no new labs.     Review of Systems:  10 systems (general, cardiovascular, respiratory, eyes, ENT, endocrine, GI, , M/S, neurological) were reviewed. Most pertinent finding(s) is/are: No chest pain, no rash, no headache. The remaining systems are all unremarkable.    Mental Status Examination:  Appearance:  neatly groomed  Attitude:  cooperative   Eye Contact:  good  Gait and Station: Normal  Psychomotor Behavior:  intact station, gait and muscle tone  Oriented to:  time, person, and place  Attention Span and Concentration:  Fair  Speech:   clear, coherent and Speaks: Fluent English  Mood:  depressed and better  Affect:  intensity is normal  Associations:  no loose associations  Thought Process:  goal oriented  Thought Content:  no evidence of suicidal ideation or homicidal ideation, no auditory hallucinations present and no visual hallucinations  present  Recent and Remote Memory:  intact Not formally assessed. No amnesia.  Fund of Knowledge: appropriate  Insight:  good  Judgment:  intact  Impulse Control:  intact    Suicide Risk Assessment:  Today Nathan Nunez reports no thoughts to harm himself or other people. In addition, there are notable risk factors for self-harm, including No identifiable factors. However, risk is mitigated by commitment to family, sobriety, absence of past attempts, history of seeking help when needed, future oriented, no access to firearms or weapons, denies suicidal intent or plan and denies homicidal ideation, intent, or plan. Therefore, based on all available evidence including the factors cited above, Nathan Nunez does not appear to be at imminent risk for self-harm, does not meet criteria for a 72-hr hold, and therefore remains appropriate for ongoing outpatient level of care.  A thorough assessment of risk factors related to suicide and self-harm have been reviewed and are noted above. The patient convincingly denies suicidality on several occasions. Local community safety resources printed and reviewed for patient to use if needed. There was no deceit detected, and the patient presented in a manner that was believable.     DSM5 Diagnosis:  296.32 (F33.1) Major Depressive Disorder, Recurrent Episode, Moderate With anxious distress    Medical comorbidities include: There are no active problems to display for this patient.      Assessment:    Nathan Nunez comes in today reporting feeling less depressed.  He also reports some increase in motivation to complete tasks but still struggles with focus and concentration.  He requires frequent reminders to follow through with tasks at home and at work.  Psychological testing is pending at the end of November 1 part of December for diagnosis clarification.  In the meantime his Wellbutrin will be increased to 450 mg daily to help with lifting depression  and improving motivation.  He will return for medication changes once psychological test results are made available.  Medication side effects and alternatives were reviewed. Health promotion activities recommended and reviewed today. All questions addressed. Education and counseling completed regarding risks and benefits of medications and psychotherapy options.    Treatment Plan:      1. Increase Wellbutrin to 450 mg daily  2. Complete Psychological testing for diagnosis clarification.      Continue all other medications as reviewed per electronic medical record today.     Safety plan reviewed. To the Emergency Department as needed or call after hours crisis line at [phone] or [phone]. [place] Crisis Text [place]. Text [place] to 124673 or Suicide LifeLine Chat: [email or web address].org/chat/    To schedule individual or family therapy, call [facility] at [phone].     Schedule an appointment with me in 4 weeks or sooner as needed. Call [facility] at [phone] to schedule.    Follow up with primary care provider as planned or for acute medical concerns.    Call the psychiatric nurse line with medication questions or concerns at [phone].    [employer] may be used to communicate with your provider, but this is not intended to be used for emergencies.    Crisis Resources:    National Suicide Prevention Lifeline: 299.649.2101 (TTY: 573.553.7647). Call anytime for help.  (www.suicidepreventionlifeline.org)  National [place] on Mental Illness (www.leni.org): 484.316.9416 or 384-656-3751.   Mental Health Association (www.mentalhealth.org): 788.711.6099 or 321-203-0686.  [place] Crisis [place]: Text [place] to 209121  Suicide LifeLine Chat: [employer].[email or web address]/chat    Administrative Billing:   Time spent with patient was 30 minutes and greater than 50% of time or 20 minutes was spent in counseling and coordination of care regarding above diagnoses and treatment  plan.    Patient Status:  Patient will continue to be seen for ongoing consultation and stabilization.    Signed:   TIRSO Garcia-BC   Psychiatry

## 2019-10-22 ASSESSMENT — ANXIETY QUESTIONNAIRES: GAD7 TOTAL SCORE: 5

## 2019-12-06 ENCOUNTER — TRANSFERRED RECORDS (OUTPATIENT)
Dept: HEALTH INFORMATION MANAGEMENT | Facility: CLINIC | Age: 24
End: 2019-12-06

## 2020-01-03 ENCOUNTER — TELEPHONE (OUTPATIENT)
Dept: PSYCHIATRY | Facility: CLINIC | Age: 25
End: 2020-01-03

## 2020-01-03 NOTE — TELEPHONE ENCOUNTER
----- Message from Perla Arora CMA sent at 1/3/2020 10:34 AM CST -----  Regarding: No show  No show pt for Keysha Bashir.    Thanks!!!      Perla Rogers CMA

## 2020-01-03 NOTE — TELEPHONE ENCOUNTER
Spoke to patient.  Apologetic for missing appointment today.   Denied SI/HI.  States he has a new job and forgot.  Patient scheduled for 1/9 at 7:45AM.  Patient has enough medication to get to that visit.    Lea Lainez RN  01/03/20  11:03 AM

## 2020-01-29 ENCOUNTER — TRANSFERRED RECORDS (OUTPATIENT)
Dept: HEALTH INFORMATION MANAGEMENT | Facility: CLINIC | Age: 25
End: 2020-01-29

## 2020-01-31 ENCOUNTER — OFFICE VISIT (OUTPATIENT)
Dept: PSYCHIATRY | Facility: CLINIC | Age: 25
End: 2020-01-31
Payer: COMMERCIAL

## 2020-01-31 VITALS
RESPIRATION RATE: 16 BRPM | BODY MASS INDEX: 37.65 KG/M2 | DIASTOLIC BLOOD PRESSURE: 86 MMHG | SYSTOLIC BLOOD PRESSURE: 124 MMHG | HEIGHT: 72 IN | OXYGEN SATURATION: 98 % | WEIGHT: 278 LBS | TEMPERATURE: 98.1 F | HEART RATE: 90 BPM

## 2020-01-31 DIAGNOSIS — F90.2 ADHD (ATTENTION DEFICIT HYPERACTIVITY DISORDER), COMBINED TYPE: Primary | ICD-10-CM

## 2020-01-31 DIAGNOSIS — F32.1 MODERATE MAJOR DEPRESSION (H): ICD-10-CM

## 2020-01-31 PROCEDURE — 99214 OFFICE O/P EST MOD 30 MIN: CPT | Performed by: NURSE PRACTITIONER

## 2020-01-31 RX ORDER — BUPROPION HYDROCHLORIDE 150 MG/1
150 TABLET ORAL EVERY MORNING
Qty: 30 TABLET | Refills: 3 | Status: SHIPPED | OUTPATIENT
Start: 2020-01-31 | End: 2020-03-16

## 2020-01-31 RX ORDER — BUPROPION HYDROCHLORIDE 300 MG/1
300 TABLET ORAL EVERY MORNING
Qty: 30 TABLET | Refills: 3 | Status: SHIPPED | OUTPATIENT
Start: 2020-01-31 | End: 2020-03-16

## 2020-01-31 RX ORDER — DEXTROAMPHETAMINE SACCHARATE, AMPHETAMINE ASPARTATE, DEXTROAMPHETAMINE SULFATE AND AMPHETAMINE SULFATE 2.5; 2.5; 2.5; 2.5 MG/1; MG/1; MG/1; MG/1
10 TABLET ORAL 2 TIMES DAILY
Qty: 28 TABLET | Refills: 0 | Status: SHIPPED | OUTPATIENT
Start: 2020-02-14 | End: 2020-02-20

## 2020-01-31 RX ORDER — DEXTROAMPHETAMINE SACCHARATE, AMPHETAMINE ASPARTATE, DEXTROAMPHETAMINE SULFATE AND AMPHETAMINE SULFATE 1.25; 1.25; 1.25; 1.25 MG/1; MG/1; MG/1; MG/1
5 TABLET ORAL 2 TIMES DAILY
Qty: 28 TABLET | Refills: 0 | Status: SHIPPED | OUTPATIENT
Start: 2020-01-31 | End: 2020-02-20

## 2020-01-31 ASSESSMENT — ANXIETY QUESTIONNAIRES
1. FEELING NERVOUS, ANXIOUS, OR ON EDGE: MORE THAN HALF THE DAYS
IF YOU CHECKED OFF ANY PROBLEMS ON THIS QUESTIONNAIRE, HOW DIFFICULT HAVE THESE PROBLEMS MADE IT FOR YOU TO DO YOUR WORK, TAKE CARE OF THINGS AT HOME, OR GET ALONG WITH OTHER PEOPLE: SOMEWHAT DIFFICULT
2. NOT BEING ABLE TO STOP OR CONTROL WORRYING: SEVERAL DAYS
7. FEELING AFRAID AS IF SOMETHING AWFUL MIGHT HAPPEN: NOT AT ALL
GAD7 TOTAL SCORE: 11
5. BEING SO RESTLESS THAT IT IS HARD TO SIT STILL: NEARLY EVERY DAY
3. WORRYING TOO MUCH ABOUT DIFFERENT THINGS: SEVERAL DAYS
6. BECOMING EASILY ANNOYED OR IRRITABLE: NEARLY EVERY DAY

## 2020-01-31 ASSESSMENT — PATIENT HEALTH QUESTIONNAIRE - PHQ9
SUM OF ALL RESPONSES TO PHQ QUESTIONS 1-9: 14
5. POOR APPETITE OR OVEREATING: SEVERAL DAYS

## 2020-01-31 ASSESSMENT — MIFFLIN-ST. JEOR: SCORE: 2281.06

## 2020-01-31 NOTE — PROGRESS NOTES
"    Outpatient Psychiatric Progress Note    Name: Nathan Nunez   : 1995                    Primary Care Provider: Nestor Shultz DO   Therapist: Individual and couples therapy     PHQ-9 scores:  PHQ-9 SCORE 2019 2019 10/21/2019   PHQ-9 Total Score 18 16 15       ISMA-7 scores:  ISMA-7 SCORE 2019 2019 10/21/2019   Total Score 5 9 5       Patient Identification:    Patient is a 24 year old year old,   White Choose not to answer male  who presents for return visit with me.  Patient is currently employed full time. Patient attended the session alone. Patient prefers to be called: \"Dipak\".    Interim History:    I last saw Nathan Nunez for outpatient psychiatry Return Visit on 2019.     During that appointment, we talked about how he was feeling less depressed since taking the Wellbutrin. I increased it to maximum dose.  He continued to have difficulties with focus and concentration.  He was scheduled for psychological testing in November for diagnosis clarification and to R/O ADHD . Once the results are available, he was to return for medication changes.      Current medications include: buPROPion (WELLBUTRIN XL) 150 MG 24 hr tablet, Take 1 tablet (150 mg) by mouth every morning Take with 300 mg tablet for a total of 450 mg daily  buPROPion (WELLBUTRIN XL) 300 MG 24 hr tablet, Take 1 tablet (300 mg) by mouth every morning Take with 150 mg tablet for a total of 450 mg daily    No current facility-administered medications on file prior to visit.        The [place] Prescription Monitoring Program has been reviewed and there are no concerns about diversionary activity for controlled substances at this time.      I was able to review most recent Primary Care Provider, specialty provider, and therapy visit notes that I have access to.     Today, patient reports that he worries about not being good enough and letting people down.  He brought in the results " "from psychological testing he had completed through [facility].  He received a diagnosis of depression which is Wellbutrin is helping decrease and also received a diagnosis of ADHD.  He tells me he continues to be forgetful and it is hard for him to settle down to concentrate at work and at home.  He tells me he has slow processing speed and it is difficult for him to finish tasks before moving on to another project.  He recently obtained a job change and now works for [employer].  He has another job pending as a  in [place] and will find out in 1 to 2 weeks if he gets that position.  He helps care for his son who is 3 months of age and his daughter who is 2 years of age.  She was born at 24 weeks gestation and was diagnosed with mild cerebral palsy that requires frequent visits to providers to coordinate her care.  He reports no sleep disturbance.  Him and his wife continue with talk therapies and he tells me his marriage is going well.     has no past medical history on file.    Social history updates:    Dipak and his wife are attending therapy sessions to learn ways to better communicate with one another.  Their two year old daughter was born at 24 weeks gestation and now requires extensive medical follow  up.  Their infant son is doing well.  Dipak recently acquired a new job that works out better for him and his family as he now works the day shift.      Substance use updates:    No alcohol use reported  Tobacco use: No    Vital Signs:   /86 (BP Location: Left arm, Patient Position: Sitting, Cuff Size: Adult Regular)   Pulse 90   Temp 98.1  F (36.7  C) (Tympanic)   Resp 16   Ht 1.816 m (5' 11.5\")   Wt 126.1 kg (278 lb)   SpO2 98%   BMI 38.23 kg/m      Labs:    Most recent laboratory results reviewed and no new labs.     Review of Systems:  10 systems (general, cardiovascular, respiratory, eyes, ENT, endocrine, GI, , M/S, neurological) were reviewed. Most " pertinent finding(s) is/are: no chest pain, no rashes, no shortness of breath. The remaining systems are all unremarkable.    Mental Status Examination:  Appearance:  awake, alert and casually dressed  Attitude:  cooperative   Eye Contact:  good  Gait and Station: Normal and No assistive Devices used  Psychomotor Behavior:  intact station, gait and muscle tone  Oriented to:  time, person, and place  Attention Span and Concentration:  Fair  Speech:   clear, coherent and Speaks: English  Mood:  better  Affect:  appropriate and in normal range  Associations:  no loose associations  Thought Process:  logical and goal oriented  Thought Content:  no evidence of suicidal ideation or homicidal ideation, no auditory hallucinations present and no visual hallucinations present  Recent and Remote Memory:  intact Not formally assessed. No amnesia.  Fund of Knowledge: appropriate  Insight:  good  Judgment:  intact  Impulse Control:  intact    Suicide Risk Assessment:  Today Nathan Nunez reports that he is ot having any thoughts to  Harm himself or other people. In addition, there are notable risk factors for self-harm, including age and anxiety. However, risk is mitigated by commitment to family, sobriety, history of seeking help when needed, future oriented, no access to firearms or weapons, denies suicidal intent or plan and denies homicidal ideation, intent, or plan. Therefore, based on all available evidence including the factors cited above, Nathan Nunez does not appear to be at imminent risk for self-harm, does not meet criteria for a 72-hr hold, and therefore remains appropriate for ongoing outpatient level of care.  A thorough assessment of risk factors related to suicide and self-harm have been reviewed and are noted above. The patient convincingly denies suicidality on several occasions. Local community safety resources printed and reviewed for patient to use if needed. There was no deceit  detected, and the patient presented in a manner that was believable.     DSM5 Diagnosis:  Attention-Deficit/Hyperactivity Disorder  314.01 (F90.2) Combined presentation  296.32 (F33.1) Major Depressive Disorder, Recurrent Episode, Moderate With mixed features    Medical comorbidities include: There are no active problems to display for this patient.      Assessment:    Nathan Nunez comes in to review his psychological test results with me.  I am starting him on Adderall 5 mg twice daily for two weeks then 10 mg twice daily for his diagnosis of ADHD.  He will continue with the Wellbutrin at 450 mg daily as he finds this helpful in decreasing his depression state.  He is continuing with ongoing individual and couples therapy to further enhance his communication abilities with his wife and children.  He started a new job which has changed his workday hours, giving him more time with his family.  Medication side effects and alternatives were reviewed. Health promotion activities recommended and reviewed today. All questions addressed. Education and counseling completed regarding risks and benefits of medications and psychotherapy options.    Treatment Plan:      1. Continue Wellbutrin 450 mg daily  2. Start Adderall 5 mg twice daily for two weeks then 10 mg twice daily      Continue all other medications as reviewed per electronic medical record today.     Safety plan reviewed. To the Emergency Department as needed or call after hours crisis line at [phone] or [phone]. [place] Crisis Text Line. Text [place] to 401789 or Suicide LifeLine Chat: suicidepreventionlifeline.org/chat/    To schedule individual or family therapy, call [facility] at [phone].    Schedule an appointment with me in 4 weeks or sooner as needed. Call [facility] at [phone] to schedule.    Follow up with primary care provider as planned or for acute medical concerns.    Call the psychiatric  nurse line with medication questions or concerns at [phone].    MyChart may be used to communicate with your provider, but this is not intended to be used for emergencies.    Crisis Resources:    National Suicide Prevention Lifeline: 366.992.9405 (TTY: 308.991.5525). Call anytime for help.  (www.suicidepreventionlifeline.org)  National [place] on Mental Illness (www.leni.org): 495.357.2448 or 581-043-1120.   Mental Health Association (www.mentalhealth.org): 592.238.3628 or 390-626-4175.  [place] Crisis Text [place]: Text [place] to 143223  Suicide LifeLine Chat: suicidepreAimetisline.org/chat    Administrative Billing:   Time spent with patient was 30 minutes and greater than 50% of time or 20 minutes was spent in counseling and coordination of care regarding above diagnoses and treatment plan.    Patient Status:  Patient will continue to be seen for ongoing consultation and stabilization.    Signed:   TIRSO Garcia-BC   Psychiatry

## 2020-01-31 NOTE — PATIENT INSTRUCTIONS
1. Continue Wellbutrin 450 mg daily  2. Start Adderall 5 mg twice daily for two weeks then 10 mg twice daily      Continue all other medications as reviewed per electronic medical record today.     Safety plan reviewed. To the Emergency Department as needed or call after hours crisis line at [phone] or [phone]. [place] Crisis Text [place]. Text [place] to 566301 or Suicide LifeLine Chat: suicideprollie.org/chat/    To schedule individual or family therapy, call [facility] at [phone].    Schedule an appointment with me in 4 weeks or sooner as needed. Call [facility] at [phone] to schedule.    Follow up with primary care provider as planned or for acute medical concerns.    Call the psychiatric nurse line with medication questions or concerns at [phone].    [employer] may be used to communicate with your provider, but this is not intended to be used for emergencies.    Crisis Resources:    National Suicide Prevention Lifeline: 870.585.6152 (TTY: 283.475.2497). Call anytime for help.  (www.suicidepreventionlifeline.org)  National [place] on Mental Illness (www.leni.org): 864.265.3354 or 414-463-8881.   Mental Health Association (www.mentalhealth.org): 878.465.6003 or 355-807-8448.  [place] Crisis [place]: Text [place] to 846436  Suicide LifeLine Chat: suicideprollie.org/chat

## 2020-02-01 ASSESSMENT — ANXIETY QUESTIONNAIRES: GAD7 TOTAL SCORE: 11

## 2020-02-25 ENCOUNTER — TELEPHONE (OUTPATIENT)
Dept: PSYCHIATRY | Facility: CLINIC | Age: 25
End: 2020-02-25

## 2020-02-25 NOTE — TELEPHONE ENCOUNTER
Reason for Call:  Medication Refill; Received call from client, will run out of medication on 2/28/20 and no refills are scheduled. He stated medication is working well.     Name of the medication requested: Adderall       Can we leave a detailed message on this number? Yes     Phone number patient can be reached at: [phone]    Best Time: Anytime     Call taken on 2/25/2020 at 1:22 PM by Darren Crane

## 2020-02-25 NOTE — TELEPHONE ENCOUNTER
Patient has a refill available at his pharmacy available today. I spoke to patient and he will go pick it up.

## 2020-03-16 ENCOUNTER — OFFICE VISIT (OUTPATIENT)
Dept: PSYCHIATRY | Facility: CLINIC | Age: 25
End: 2020-03-16
Payer: COMMERCIAL

## 2020-03-16 VITALS
SYSTOLIC BLOOD PRESSURE: 122 MMHG | DIASTOLIC BLOOD PRESSURE: 78 MMHG | RESPIRATION RATE: 12 BRPM | TEMPERATURE: 97.2 F | HEART RATE: 88 BPM | BODY MASS INDEX: 37.52 KG/M2 | WEIGHT: 277 LBS | OXYGEN SATURATION: 98 % | HEIGHT: 72 IN

## 2020-03-16 DIAGNOSIS — F90.2 ADHD (ATTENTION DEFICIT HYPERACTIVITY DISORDER), COMBINED TYPE: ICD-10-CM

## 2020-03-16 DIAGNOSIS — F33.0 MILD RECURRENT MAJOR DEPRESSION (H): Primary | ICD-10-CM

## 2020-03-16 PROCEDURE — 99214 OFFICE O/P EST MOD 30 MIN: CPT | Performed by: NURSE PRACTITIONER

## 2020-03-16 RX ORDER — DEXTROAMPHETAMINE SACCHARATE, AMPHETAMINE ASPARTATE, DEXTROAMPHETAMINE SULFATE AND AMPHETAMINE SULFATE 2.5; 2.5; 2.5; 2.5 MG/1; MG/1; MG/1; MG/1
10 TABLET ORAL 2 TIMES DAILY
Qty: 60 TABLET | Refills: 0 | Status: CANCELLED | OUTPATIENT
Start: 2020-03-16

## 2020-03-16 RX ORDER — DEXTROAMPHETAMINE SACCHARATE, AMPHETAMINE ASPARTATE, DEXTROAMPHETAMINE SULFATE AND AMPHETAMINE SULFATE 3.75; 3.75; 3.75; 3.75 MG/1; MG/1; MG/1; MG/1
15 TABLET ORAL 2 TIMES DAILY
Qty: 60 TABLET | Refills: 0 | Status: SHIPPED | OUTPATIENT
Start: 2020-03-16 | End: 2020-04-10 | Stop reason: DRUGHIGH

## 2020-03-16 ASSESSMENT — PATIENT HEALTH QUESTIONNAIRE - PHQ9
SUM OF ALL RESPONSES TO PHQ QUESTIONS 1-9: 3
5. POOR APPETITE OR OVEREATING: SEVERAL DAYS

## 2020-03-16 ASSESSMENT — ANXIETY QUESTIONNAIRES
1. FEELING NERVOUS, ANXIOUS, OR ON EDGE: NOT AT ALL
GAD7 TOTAL SCORE: 3
7. FEELING AFRAID AS IF SOMETHING AWFUL MIGHT HAPPEN: NOT AT ALL
5. BEING SO RESTLESS THAT IT IS HARD TO SIT STILL: NOT AT ALL
2. NOT BEING ABLE TO STOP OR CONTROL WORRYING: NOT AT ALL
3. WORRYING TOO MUCH ABOUT DIFFERENT THINGS: SEVERAL DAYS
IF YOU CHECKED OFF ANY PROBLEMS ON THIS QUESTIONNAIRE, HOW DIFFICULT HAVE THESE PROBLEMS MADE IT FOR YOU TO DO YOUR WORK, TAKE CARE OF THINGS AT HOME, OR GET ALONG WITH OTHER PEOPLE: NOT DIFFICULT AT ALL
6. BECOMING EASILY ANNOYED OR IRRITABLE: SEVERAL DAYS

## 2020-03-16 ASSESSMENT — MIFFLIN-ST. JEOR: SCORE: 2271.52

## 2020-03-16 NOTE — PATIENT INSTRUCTIONS
1. Wellbutrin discontinued  2.  Adderall increased to 15 mg twice daily      Continue all other medications as reviewed per electronic medical record today.     Safety plan reviewed. To the Emergency Department as needed or call after hours crisis line at [phone] or [phone]. [place] Crisis Text [place]. Text [place] to 813007 or Suicide LifeLine Chat: [employer].org/chat/    To schedule individual or family therapy, call [facility] at [phone].    Schedule an appointment with me in 4 weeks  or sooner as needed. Call [facility] at [phone] to schedule.    Follow up with primary care provider as planned or for acute medical concerns.    Call the psychiatric nurse line with medication questions or concerns at [phone].    [employer] may be used to communicate with your provider, but this is not intended to be used for emergencies.    Crisis Resources:    National Suicide Prevention Lifeline: 229.510.6012 (TTY: 356.489.6024). Call anytime for help.  (www.suicidepreventionlifeline.org)  National [place] on Mental Illness (www.leni.org): 533.540.4102 or 012-579-3455.   Mental Health Association (www.mentalhealth.org): 490.326.7416 or 397-963-6197.  [place] Crisis [place]: Text [place] to 427665  Suicide LifeLine Chat: suicide3G [employer].org/chat

## 2020-03-16 NOTE — PROGRESS NOTES
"    Outpatient Psychiatric Progress Note    Name: Nathan Nunez   : 1995                    Primary Care Provider: Nestor Shultz DO   Therapist: Individual and couples therapies    PHQ-9 scores:  PHQ-9 SCORE 2019 10/21/2019 2020   PHQ-9 Total Score 16 15 14       ISMA-7 scores:  ISMA-7 SCORE 2019 10/21/2019 2020   Total Score 9 5 11       Patient Identification:    Patient is a 25 year old year old,   White Choose not to answer male  who presents for return visit with me.  Patient is currently employed full time. Patient attended the session alone. Patient prefers to be called: \" Dipak\".    Interim History:    I last saw Nathan Nunez for outpatient psychiatry Return Visit on 2020.     During that appointment, we reviewed his psychological test results that revealed a diagnosis of ADHD.  I did start him on Adderall.  He was to continue with the 450 mg dose of Wellbutrin as he found it helpful in decreasing his depression.  He told me he recently started a new job which will change his workday hours so that he would be able to spend more time with his family.  He also informed me that he was continuing with individual and couples therapy to improve communication.     Current medications include: amphetamine-dextroamphetamine 10 MG PO tablet, Take 1 tablet (10 mg) by mouth 2 times daily  buPROPion (WELLBUTRIN XL) 150 MG 24 hr tablet, Take 1 tablet (150 mg) by mouth every morning Take with 300 mg tablet for a total of 450 mg daily  buPROPion (WELLBUTRIN XL) 300 MG 24 hr tablet, Take 1 tablet (300 mg) by mouth every morning Take with 150 mg tablet for a total of 450 mg daily    No current facility-administered medications on file prior to visit.        The [place] Prescription Monitoring Program has been reviewed and there are no concerns about diversionary activity for controlled substances at this time.      I was able to review most recent Primary " "Care Provider, specialty provider, and therapy visit notes that I have access to.     Today, patient reports that things are \"better\".  Dipak tells me he has a more positive attitude and feels like he is getting more things done around his house and at work.  He reports clear thinking.  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  This would allow him to have better insurance.  Sleep continues to be disrupted which he attributes to his son who is 5 months of age with frequent awakenings.  With regards to his wife, he tells me he has been engaging more with her in activities to enhance their relationship.  Dipak tells me she has noticed that he is doing better at communicating with her.  He reports less anger outbursts but continues to have some mild irritability that is mostly manageable.     has no past medical history on file.    Social history updates:    Dipak has been spending time with his wife and their 2 young children.  He has some difficulty sleeping through the night given their infant son being awake.    Substance use updates:    Occasional beer drinking     Tobacco use: No    Vital Signs:   /78 (BP Location: Right arm, Patient Position: Sitting, Cuff Size: Adult Regular)   Pulse 88   Temp 97.2  F (36.2  C) (Tympanic)   Resp 12   Ht 1.816 m (5' 11.5\")   Wt 125.6 kg (277 lb)   SpO2 98%   BMI 38.10 kg/m      Labs:    Most recent laboratory results reviewed and no new labs.     Review of Systems:  10 systems (general, cardiovascular, respiratory, eyes, ENT, endocrine, GI, , M/S, neurological) were reviewed. Most pertinent finding(s) is/are: No chest pain, no rash, no shortness of breath. The remaining systems are all unremarkable.    Mental Status Examination:  Appearance:  awake, alert, adequately groomed and neatly groomed  Attitude:  cooperative   Eye Contact:  good  Gait and Station: No assistive Devices used and No " dizziness or falls  Psychomotor Behavior:  intact station, gait and muscle tone  Oriented to:  time, person, and place  Attention Span and Concentration:  Normal  Speech:   clear, coherent and Speaks: English  Mood:  better  Affect:  appropriate and in normal range  Associations:  no loose associations  Thought Process:  logical and goal oriented  Thought Content:  no evidence of suicidal ideation or homicidal ideation, no auditory hallucinations present and no visual hallucinations present  Recent and Remote Memory:  intact Not formally assessed. No amnesia.  Fund of Knowledge: appropriate  Insight:  good  Judgment:  intact  Impulse Control:  intact    Suicide Risk Assessment:  Today Nathan Nunez reports he is having no thoughts to want to end his life or to harm other people. In addition, there are notable risk factors for self-harm, including age and mood change. However, risk is mitigated by commitment to family, sobriety, absence of past attempts, history of seeking help when needed, future oriented, no access to firearms or weapons, denies suicidal intent or plan and denies homicidal ideation, intent, or plan. Therefore, based on all available evidence including the factors cited above, Nathan Nunez does not appear to be at imminent risk for self-harm, does not meet criteria for a 72-hr hold, and therefore remains appropriate for ongoing outpatient level of care.  A thorough assessment of risk factors related to suicide and self-harm have been reviewed and are noted above. The patient convincingly denies suicidality on several occasions. Local community safety resources printed and reviewed for patient to use if needed. There was no deceit detected, and the patient presented in a manner that was believable.     DSM5 Diagnosis:  Attention-Deficit/Hyperactivity Disorder  314.01 (F90.2) Combined presentation  296.31 (F33.0) Major Depressive Disorder, Recurrent Episode, Mild With mixed  features    Medical comorbidities include: There are no active problems to display for this patient.      Assessment:    Nathan Nunez has made great improvements in his abilities to attend to tasks with decreased irritability and improved focus and concentration.  He has stopped taking the Wellbutrin as he finds his depression is more manageable.  At this point his Adderall was increased to 15 mg twice daily as he continues to improve with staying on task.  He is going to start a new job on March 27 and would like to be able to complete job tasks and learn new skills as opposed to becoming disorganized and scattered in his thinking.  Family life in communication with his wife has improved.    Medication side effects and alternatives were reviewed. Health promotion activities recommended and reviewed today. All questions addressed. Education and counseling completed regarding risks and benefits of medications and psychotherapy options.    Treatment Plan:      1. Wellbutrin discontinued  2.  Adderall increased to 15 mg twice daily      Continue all other medications as reviewed per electronic medical record today.     Safety plan reviewed. To the Emergency Department as needed or call after hours crisis line at [phone] or [phone]. [place] Crisis Text [place]. Text [place] to 344132 or Suicide LifeLine Chat: suicidepreventionlifeline.org/chat/    To schedule individual or family therapy, call [facility] at [phone].    Schedule an appointment with me in 4 weeks  or sooner as needed. Call [facility] at [phone] to schedule.    Follow up with primary care provider as planned or for acute medical concerns.    Call the psychiatric nurse line with medication questions or concerns at [phone].    Th[employer] Arc[employer]hart may be used to communicate with your provider, but this is not intended to be used for emergencies.    Crisis Resources:    National Suicide Prevention Lifeline:  257.773.6971 (TTY: 254.114.2170). Call anytime for help.  (www.suicidepreventionlifeline.org)  National [place] on Mental Illness (www.leni.org): 113.942.8418 or 042-075-4469.   Mental Health Association (www.mentalhealth.org): 291.250.5267 or 657-580-4005.  [place] Crisis Text [place]: Text [place] to 750953  Suicide LifeLine Chat: suicideOutrigger Media.org/chat    Administrative Billing:   Time spent with patient was 30 minutes and greater than 50% of time or 20 minutes was spent in counseling and coordination of care regarding above diagnoses and treatment plan.    Patient Status:  Patient will continue to be seen for ongoing consultation and stabilization.    Signed:   TIRSO Garcia-BC   Psychiatry

## 2020-03-17 ASSESSMENT — ANXIETY QUESTIONNAIRES: GAD7 TOTAL SCORE: 3

## 2020-04-10 ENCOUNTER — VIRTUAL VISIT (OUTPATIENT)
Dept: PSYCHIATRY | Facility: CLINIC | Age: 25
End: 2020-04-10
Payer: COMMERCIAL

## 2020-04-10 DIAGNOSIS — F90.2 ADHD (ATTENTION DEFICIT HYPERACTIVITY DISORDER), COMBINED TYPE: Primary | ICD-10-CM

## 2020-04-10 PROCEDURE — 99214 OFFICE O/P EST MOD 30 MIN: CPT | Mod: 95 | Performed by: NURSE PRACTITIONER

## 2020-04-10 RX ORDER — DEXTROAMPHETAMINE SACCHARATE, AMPHETAMINE ASPARTATE, DEXTROAMPHETAMINE SULFATE AND AMPHETAMINE SULFATE 5; 5; 5; 5 MG/1; MG/1; MG/1; MG/1
20 TABLET ORAL 2 TIMES DAILY
Qty: 60 TABLET | Refills: 0 | Status: SHIPPED | OUTPATIENT
Start: 2020-04-10 | End: 2020-07-23

## 2020-04-10 ASSESSMENT — PATIENT HEALTH QUESTIONNAIRE - PHQ9
SUM OF ALL RESPONSES TO PHQ QUESTIONS 1-9: 4
5. POOR APPETITE OR OVEREATING: SEVERAL DAYS

## 2020-04-10 ASSESSMENT — ANXIETY QUESTIONNAIRES
6. BECOMING EASILY ANNOYED OR IRRITABLE: SEVERAL DAYS
2. NOT BEING ABLE TO STOP OR CONTROL WORRYING: SEVERAL DAYS
7. FEELING AFRAID AS IF SOMETHING AWFUL MIGHT HAPPEN: NOT AT ALL
1. FEELING NERVOUS, ANXIOUS, OR ON EDGE: NOT AT ALL
IF YOU CHECKED OFF ANY PROBLEMS ON THIS QUESTIONNAIRE, HOW DIFFICULT HAVE THESE PROBLEMS MADE IT FOR YOU TO DO YOUR WORK, TAKE CARE OF THINGS AT HOME, OR GET ALONG WITH OTHER PEOPLE: SOMEWHAT DIFFICULT
GAD7 TOTAL SCORE: 5
3. WORRYING TOO MUCH ABOUT DIFFERENT THINGS: SEVERAL DAYS
5. BEING SO RESTLESS THAT IT IS HARD TO SIT STILL: SEVERAL DAYS

## 2020-04-10 NOTE — PATIENT INSTRUCTIONS
1.  Increase Adderall to 20 mg twice daily  2. Call in your blood pressure reading after you donate plasma on Monday April 13        1.  Increase Adderall to 20 mg twice daily  2. Call in your blood pressure reading after you donate plasma on Monday April 13      Continue all other medications as reviewed per electronic medical record today.     Safety plan reviewed. To the Emergency Department as needed or call after hours crisis line at [phone] or [phone]. [place] Crisis [place]. Text [place] to 618561 or Suicide LifeLine Chat: suicideRiverbed Technology.org/chat/    To schedule individual or family therapy, call [facility] at [phone].    Schedule an appointment with me in 6 weeks  or sooner as needed. Call [facility] at [phone] to schedule.    Follow up with primary care provider as planned or for acute medical concerns.    Call the psychiatric nurse line with medication questions or concerns at [phone].    [employer] Fl[employer] Solutionshart may be used to communicate with your provider, but this is not intended to be used for emergencies.    Crisis Resources:    National Suicide Prevention Lifeline: 869.962.9451 (TTY: 985.515.9833). Call anytime for help.  (www.suicidepreventionlifeline.org)  National [place] on Mental Illness (www.leni.org): 874.509.5639 or 006-313-0279.   Mental Health Association (www.mentalhealth.org): 350.184.3900 or 892-633-6467.  [place] Crisis Text [place]: Text [place] to 116466  Suicide LifeLine Chat: suicideCarZumerline.org/chat

## 2020-04-10 NOTE — PROGRESS NOTES
"Nathan Nunez is a 25 year old male who is being evaluated via a billable telephone visit.      The patient has been notified of following:     \"This telephone visit will be conducted via a call between you and your physician/provider. We have found that certain health care needs can be provided without the need for a physical exam.  This service lets us provide the care you need with a short phone conversation.  If a prescription is necessary we can send it directly to your pharmacy.  If lab work is needed we can place an order for that and you can then stop by our lab to have the test done at a later time.    Telephone visits are billed at different rates depending on your insurance coverage. During this emergency period, for some insurers they may be billed the same as an in-person visit.  Please reach out to your insurance provider with any questions.    If during the course of the call the physician/provider feels a telephone visit is not appropriate, you will not be charged for this service.\"    Patient has given verbal consent for Telephone visit?  Yes    How would you like to obtain your AVS? Mail a copy    Nathan Nunez complains of    Chief Complaint   Patient presents with     Recheck Medication     Pt reports that the mediations are going well. He just started a new job and does not have insurance at the moment, but believes he will be ok with refills until next month. Sx have improved, but would like to increase his doses. No side effects noted.       I have reviewed and updated the patient's Past Medical History, Social History, Family History and Medication List.    ALLERGIES  Patient has no known allergies.        Phone call duration: 20 minutes    Keysha Bashir NP       Outpatient Psychiatric Progress Note    Name: Nathan Nunez   : 1995                    Primary Care Provider: Nestor Shultz DO   Therapist: None      PHQ-9 scores:  PHQ-9 SCORE " "1/31/2020 3/16/2020 4/10/2020   PHQ-9 Total Score 14 3 4       ISMA-7 scores:  ISMA-7 SCORE 1/31/2020 3/16/2020 4/10/2020   Total Score 11 3 5       Patient Identification:    Patient is a 25 year old year old,   White Choose not to answer male  who presents for return visit with me.  Patient is currently employed full time. Patient attended the session alone. Patient prefers to be called: \"Dipak\".    Interim History:    I last saw Nathan Nunez for outpatient psychiatry Return Visit on March 16, 2020.     During that appointment,  He had made great improvements in his abilities to attend to tasks with decreased irritability and improved focus and concentration.  He has stopped taking the Wellbutrin as he finds his depression is more manageable.  At this point his Adderall was increased to 15 mg twice daily as he continues to improve with staying on task.  He is going to start a new job on March 27 and would like to be able to complete job tasks and learn new skills as opposed to becoming disorganized and scattered in his thinking.  Family life in communication with his wife has improved. .     Current medications include: No current outpatient medications on file prior to visit.  No current facility-administered medications on file prior to visit.        The [place] Prescription Monitoring Program has been reviewed and there are no concerns about diversionary activity for controlled substances at this time.      I was able to review most recent Primary Care Provider, specialty provider, and therapy visit notes that I have access to.     Today, patient reports that he is in a \"good mood\".  He has been working in his new job position for the past two weeks and is enjoying it.  He voiced no concerns f or depression or anxiety today.  Despite his baby waking him up at night, overall he reports sleeping \"better\".  Dipak denies having any suicide thoughts.  Today he would like to increase the dose of his " Adderall as he still has trouble focusing and concentrating for sustained periods of time.  He does admit to, sometimes, taking his second does of Adderall.  When this happens he notices more  Difficulties in focusing and concentrating.   He also notices less  Motivation occurring to do tasks around the house.       has no past medical history on file.    Social history updates:    Staying home - goes out to grocery shopping  Wife and kids doing well    Substance use updates:    Rare use of beer  Tobacco use: No    Vital Signs:   There were no vitals taken for this visit.    Labs:    Most recent laboratory results reviewed and no new labs.     Review of Systems:  10 systems (general, cardiovascular, respiratory, eyes, ENT, endocrine, GI, , M/S, neurological) were reviewed. Most pertinent finding(s) is/are: Reports no shortness of breath , reports no chest pain, no rashes. The remaining systems are all unremarkable.    Mental Status Examination:  Appearance:  awake, alert  Attitude:  cooperative   Eye Contact:  unable to assess  Gait and Station: No assistive Devices used and No dizziness or falls  Psychomotor Behavior:  unableto assess  Oriented to:  time, person, and place  Attention Span and Concentration:  Normal  Speech:   clear, coherent  Mood:  anxious and better  Affect:  appropriate and in normal range  Associations:  no loose associations  Thought Process:  logical and goal oriented  Thought Content:  no evidence of suicidal ideation or homicidal ideation, no auditory hallucinations present and no visual hallucinations present  Recent and Remote Memory:  intact Not formally assessed. No amnesia.  Fund of Knowledge: appropriate  Insight:  good  Judgment:  intact  Impulse Control:  intact    Suicide Risk Assessment:  Today Nathan Nunez reports that he is having no thoughts to end his life or to  Harm others. In addition, there are notable risk factors for self-harm, including anxiety and mood  change. However, risk is mitigated by commitment to family, sobriety, history of seeking help when needed, future oriented, no access to firearms or weapons, denies suicidal intent or plan and denies homicidal ideation, intent, or plan. Therefore, based on all available evidence including the factors cited above, Nathan Nunez does not appear to be at imminent risk for self-harm, does not meet criteria for a 72-hr hold, and therefore remains appropriate for ongoing outpatient level of care.  A thorough assessment of risk factors related to suicide and self-harm have been reviewed and are noted above. The patient convincingly denies suicidality on several occasions. Local community safety resources printed and reviewed for patient to use if needed. There was no deceit detected, and the patient presented in a manner that was believable.     DSM5 Diagnosis:  Attention-Deficit/Hyperactivity Disorder  314.01 (F90.2) Combined presentation  296.31 (F33.0) Major Depressive Disorder, Recurrent Episode, Mild With mixed features  Medical comorbidities include: There are no active problems to display for this patient.      Assessment:    Nathan Nunez  Reports that he is experiencing less depression and anxiety.  He does report a decrease in focus, concentration, and motivation at  Work and at home.  Today I increased his Adderall dose to 20 mg twice daily   He is to call in his blood pressure  Reading after donating plasma on April 13.  At this point he denies any tachycardia or increased agitation while taking Adderall.  Medication side effects and alternatives were reviewed. Health promotion activities recommended and reviewed today. All questions addressed. Education and counseling completed regarding risks and benefits of medications and psychotherapy options.    Treatment Plan:      1.  Increase Adderall to 20 mg twice daily  2. Call in your blood pressure reading after you donate plasa on Monday April  13      Continue all other medications as reviewed per electronic medical record today.     Safety plan reviewed. To the Emergency Department as needed or call after hours crisis line at [phone] or [phone]. [place] Crisis Text Line. Text [place] to 406873 or Suicide LifeLine Chat: suicideEARTHNET.org/chat/    To schedule individual or family therapy, call [facility] at [phone].    Schedule an appointment with me in 6 weeks  or sooner as needed. Call [facility] at [phone] to schedule.    Follow up with primary care provider as planned or for acute medical concerns.    Call the psychiatric nurse line with medication questions or concerns at [phone].    [employer]unch may be used to communicate with your provider, but this is not intended to be used for emergencies.    Crisis Resources:    National Suicide Prevention Lifeline: 827.582.1540 (TTY: 943.948.4634). Call anytime for help.  (www.suicidepreventionlifeline.org)  National [place] on Mental Illness (www.leni.org): 559.706.7171 or 864-311-6679.   Mental Health Association (www.mentalhealth.org): 526.642.1137 or 164-139-3044.  [place] Crisis Text [place]: Text [place] to 798142  Suicide LifeLine Chat: suicideEARTHNET.org/chat    Administrative Billing:   Time spent with patient was 30 minutes and greater than 50% of time or 20 minutes was spent in counseling and coordination of care regarding above diagnoses and treatment plan.    Patient Status:  Patient will continue to be seen for ongoing consultation and stabilization.    Signed:   TIRSO Garcia-BC   Psychiatry

## 2020-04-11 ASSESSMENT — ANXIETY QUESTIONNAIRES: GAD7 TOTAL SCORE: 5

## 2020-04-21 ENCOUNTER — TELEPHONE (OUTPATIENT)
Dept: PSYCHIATRY | Facility: CLINIC | Age: 25
End: 2020-04-21

## 2020-04-21 NOTE — TELEPHONE ENCOUNTER
I called patient. He said he just donated plasma yesterday and forgot to record his BP. He is donating again tomorrow and will call with the BP after that donation. Patient was given Psych RN line number and was directed to leave a message with his BP reading tomorrow.

## 2020-04-21 NOTE — TELEPHONE ENCOUNTER
----- Message from Keysha Bashir NP sent at 4/21/2020 10:43 AM CDT -----  Would you please contact Dipak to inquire if he has a blood pressure reading for me  after he donated plasma  last  week?  (he  is  on Adderall).  Thanks -

## 2020-04-23 NOTE — TELEPHONE ENCOUNTER
Patient called back today and a new encounter was generated by Intake. That encounter was closed and message was consolidated to the original thread. Routing to provider for review.    Darren Crane 23 minutes ago (1:18 PM)          Reason for Call:  Received call from client, stated he was informed to notify the clinician of his Blood Pressure.  123/77 Taken yesterday       Phone Number Patient can be reached at: [phone]      Call taken on 4/23/2020 at 1:14 PM by Darren Crane

## 2020-06-18 ENCOUNTER — VIRTUAL VISIT (OUTPATIENT)
Dept: PSYCHIATRY | Facility: CLINIC | Age: 25
End: 2020-06-18
Payer: COMMERCIAL

## 2020-06-18 ENCOUNTER — TELEPHONE (OUTPATIENT)
Dept: PSYCHIATRY | Facility: CLINIC | Age: 25
End: 2020-06-18

## 2020-06-18 DIAGNOSIS — F33.0 MILD RECURRENT MAJOR DEPRESSION (H): ICD-10-CM

## 2020-06-18 DIAGNOSIS — F90.2 ADHD (ATTENTION DEFICIT HYPERACTIVITY DISORDER), COMBINED TYPE: Primary | ICD-10-CM

## 2020-06-18 DIAGNOSIS — F90.2 ADHD (ATTENTION DEFICIT HYPERACTIVITY DISORDER), COMBINED TYPE: ICD-10-CM

## 2020-06-18 PROCEDURE — 99214 OFFICE O/P EST MOD 30 MIN: CPT | Mod: TEL | Performed by: NURSE PRACTITIONER

## 2020-06-18 RX ORDER — LISDEXAMFETAMINE DIMESYLATE 40 MG/1
40 CAPSULE ORAL EVERY MORNING
Qty: 30 CAPSULE | Refills: 0 | Status: SHIPPED | OUTPATIENT
Start: 2020-06-18 | End: 2020-07-23 | Stop reason: DRUGHIGH

## 2020-06-18 ASSESSMENT — PATIENT HEALTH QUESTIONNAIRE - PHQ9
5. POOR APPETITE OR OVEREATING: SEVERAL DAYS
SUM OF ALL RESPONSES TO PHQ QUESTIONS 1-9: 8

## 2020-06-18 ASSESSMENT — ANXIETY QUESTIONNAIRES
2. NOT BEING ABLE TO STOP OR CONTROL WORRYING: SEVERAL DAYS
3. WORRYING TOO MUCH ABOUT DIFFERENT THINGS: SEVERAL DAYS
7. FEELING AFRAID AS IF SOMETHING AWFUL MIGHT HAPPEN: NOT AT ALL
IF YOU CHECKED OFF ANY PROBLEMS ON THIS QUESTIONNAIRE, HOW DIFFICULT HAVE THESE PROBLEMS MADE IT FOR YOU TO DO YOUR WORK, TAKE CARE OF THINGS AT HOME, OR GET ALONG WITH OTHER PEOPLE: VERY DIFFICULT
6. BECOMING EASILY ANNOYED OR IRRITABLE: SEVERAL DAYS
GAD7 TOTAL SCORE: 6
5. BEING SO RESTLESS THAT IT IS HARD TO SIT STILL: SEVERAL DAYS
1. FEELING NERVOUS, ANXIOUS, OR ON EDGE: SEVERAL DAYS

## 2020-06-18 NOTE — TELEPHONE ENCOUNTER
Prior Authorization Retail Medication Request    Medication/Dose: Vyvanse 40mg  ICD code (if different than what is on RX):  f90.2  Previously Tried and Failed:    Rationale:      Insurance Name:  [employer]  Insurance ID:  [id number]      Vicenta Graham CPhT  [id number])  [address] [place] [address]  Phone: [phone]  Fax: [phone]

## 2020-06-18 NOTE — PATIENT INSTRUCTIONS
1.  Discontinue Adderall  2.  Vyvanse 40 mg daily for ADHD       Continue all other medications as reviewed per electronic medical record today.     Safety plan reviewed. To the Emergency Department as needed or call after hours crisis line at [phone] or [phone]. [place] Crisis Text Line. Text [place] to 396331 or Suicide LifeLine Chat: [employer].org/chat/    To schedule individual or family therapy, call [facility] at [phone].    Schedule an appointment with me in 4 weeks or sooner as needed. Call [facility] at [phone] to schedule.    Follow up with primary care provider as planned or for acute medical concerns.    Call the psychiatric nurse line with medication questions or concerns at [phone].    [employer] may be used to communicate with your provider, but this is not intended to be used for emergencies.    Crisis Resources:    National Suicide Prevention Lifeline: 288.515.4376 (TTY: 425.372.7959). Call anytime for help.  (www.suicidepreventionlifeline.org)  National [place] on Mental Illness (www.leni.org): 469.256.4203 or 752-590-3108.   Mental Health Association (www.mentalhealth.org): 304.777.1734 or 276-781-5398.  [place] Crisis Text Line: Text [place] to 846241  Suicide LifeLine Chat: suicideCompanion Canine.org/chat

## 2020-06-18 NOTE — TELEPHONE ENCOUNTER
Central Prior Authorization Team   Phone: [phone]    [place] Initiation    Medication: Vyvanse 40mg  Insurance Company: [employer] [place] - Phone [phone] Fax [phone]  Pharmacy Filling the Rx: [facility] [place] [address]  Filling Pharmacy Phone: [phone]  Filling Pharmacy Fax: [phone]  Start Date: 6/18/2020

## 2020-06-18 NOTE — PROGRESS NOTES
"Nathan Nunez is a 25 year old male who is being evaluated via a billable telephone visit.      The patient has been notified of following:     \"This telephone visit will be conducted via a call between you and your physician/provider. We have found that certain health care needs can be provided without the need for a physical exam.  This service lets us provide the care you need with a short phone conversation.  If a prescription is necessary we can send it directly to your pharmacy.  If lab work is needed we can place an order for that and you can then stop by our lab to have the test done at a later time.    Telephone visits are billed at different rates depending on your insurance coverage. During this emergency period, for some insurers they may be billed the same as an in-person visit.  Please reach out to your insurance provider with any questions.    If during the course of the call the physician/provider feels a telephone visit is not appropriate, you will not be charged for this service.\"    Patient has given verbal consent for Telephone visit?  Yes    What phone number would you like to be contacted at? [phone]      How would you like to obtain your AVS? Mail a copy      Phone call duration: 30 minutes    Keysha Bashir NP          Outpatient Psychiatric Progress Note    Name: Nathan Nunez   : 1995                    Primary Care Provider: Nestor Shultz DO   Therapist: None     PHQ 3/16/2020 4/10/2020 2020   PHQ-9 Total Score 3 4 8   Q9: Thoughts of better off dead/self-harm past 2 weeks Not at all Not at all Not at all       ISMA-7 scores:  ISMA-7 SCORE 3/16/2020 4/10/2020 2020   Total Score 3 5 6         Patient Identification:    Patient is a 25 year old year old,   White Choose not to answer male  who presents for return visit with me.  Patient is currently employed full time. Patient attended the session alone. Patient prefers to be called: " "\"Dipak\".    Interim History:    I last saw Nathan Nunez for outpatient psychiatry Return Visit on April 10, 2020.     During that appointment, he reported that he was experiencing less depression and anxiety.  He does report a decrease in focus, concentration, and motivation at  Work and at home.  Today I increased his Adderall dose to 20 mg twice daily   He is to call in his blood pressure  Reading after donating plasma on April 13.  At this point he denies any tachycardia or increased agitation while taking Adderall .     Current medications include: amphetamine-dextroamphetamine (ADDERALL) 20 MG tablet, Take 1 tablet (20 mg) by mouth 2 times daily    No current facility-administered medications on file prior to visit.        The [place] Prescription Monitoring Program has been reviewed and there are no concerns about diversionary activity for controlled substances at this time.      I was able to review most recent Primary Care Provider, specialty provider, and therapy visit notes that I have access to.     Today, patient reports things are going well.  He noticed with the increase in the Adderall he has been waking up easier.  He is still forgetting things to take to work.  He is able to get his job done.  Sometimes he forgets to take his medication twice daily.     has no past medical history on file.    Social history updates:    Dipak lives with his wife and two children.  His wife found out that she does not qualify for unemployment    Substance use updates:    He does not drink alcohol  Tobacco use: No    Vital Signs:   There were no vitals taken for this visit.    Labs:    Most recent laboratory results reviewed and no new labs.     Review of Systems:  10 systems (general, cardiovascular, respiratory, eyes, ENT, endocrine, GI, , M/S, neurological) were reviewed. Most pertinent finding(s) is/are: He reports no chest pain, no skin rashes, no shortness of breath. The remaining systems are all " unremarkable.    Mental Status Examination:  Appearance:  awake, alert  Attitude:  cooperative   Eye Contact:  unable to assess  Gait and Station: No assistive Devices used and No dizziness or falls  Psychomotor Behavior:  unable to assess  Oriented to:  time, person, and place  Attention Span and Concentration:  Normal  Speech:   clear, coherent and Speaks: English  Mood:  anxious and better  Affect:  appropriate and in normal range  Associations:  no loose associations  Thought Process:  logical and goal oriented  Thought Content:  no evidence of suicidal ideation or homicidal ideation, no auditory hallucinations present and no visual hallucinations present  Recent and Remote Memory:  intact Not formally assessed. No amnesia.  Fund of Knowledge: appropriate  Insight:  good  Judgment:  intact  Impulse Control:  intact    Suicide Risk Assessment:  Today Nathan Nunez reports that he is  Having no thoughts to harm himself or other people. In addition, there are notable risk factors for self-harm, including anxiety. However, risk is mitigated by commitment to family, sobriety, absence of past attempts, history of seeking help when needed, future oriented, no access to firearms or weapons, denies suicidal intent or plan and denies homicidal ideation, intent, or plan. Therefore, based on all available evidence including the factors cited above, Nathan Nunez does not appear to be at imminent risk for self-harm, does not meet criteria for a 72-hr hold, and therefore remains appropriate for ongoing outpatient level of care.  A thorough assessment of risk factors related to suicide and self-harm have been reviewed and are noted above. The patient convincingly denies suicidality on several occasions. Local community safety resources printed and reviewed for patient to use if needed. There was no deceit detected, and the patient presented in a manner that was believable.     DSM5  Diagnosis:  Attention-Deficit/Hyperactivity Disorder  314.01 (F90.2) Combined presentation  296.31 (F33.0) Major Depressive Disorder, Recurrent Episode, Mild With anxious distress    Medical comorbidities include: There are no active problems to display for this patient.      Assessment:    Nathan Nunez  Reports that he sometimes forgets to take his medication later in the day.  He continues to have some breakthrough concentration and organization deficits at work.  We changed from Adderall to Vyvanse 40 mg daily..    Medication side effects and alternatives were reviewed. Health promotion activities recommended and reviewed today. All questions addressed. Education and counseling completed regarding risks and benefits of medications and psychotherapy options.    Treatment Plan:    1.  Discontinue Adderall  2.  Vyvanse 40 mg daily for ADHD    Continue all other medications as reviewed per electronic medical record today.     Safety plan reviewed. To the Emergency Department as needed or call after hours crisis line at [phone] or [phone]. [place] Crisis Text [place]. Text [place] to 106837 or Suicide LifeLine Chat: suicidepreventionNodalityline.org/chat/    To schedule individual or family therapy, call [facility] at [phone].    Schedule an appointment with me in 4 weeks or sooner as needed. Call [facility] at [phone] to schedule.    Follow up with primary care provider as planned or for acute medical concerns.    Call the psychiatric nurse line with medication questions or concerns at [phone].    [employer] may be used to communicate with your provider, but this is not intended to be used for emergencies.    Crisis Resources:    National Suicide Prevention Lifeline: 509.781.1913 (TTY: 312.941.9626). Call anytime for help.  (www.suicidepreventionlifeline.org)  National [place] on Mental Illness (www.leni.org): 570.862.2102 or 668-198-8615.   Mental Health  Association (www.mentalhealth.org): 456-898-0972 or 437-953-9147.  [place] Crisis [place]: Text [place] to 462737  Suicide LifeLine Chat: suicidepreventionSmartaxiline.org/chat    Administrative Billing:   Time spent with patient was 30 minutes and greater than 50% of time or 20 minutes was spent in counseling and coordination of care regarding above diagnoses and treatment plan.    Patient Status:  Patient will continue to be seen for ongoing consultation and stabilization.    Signed:   TIRSO Garcia-BC   Psychiatry

## 2020-06-19 ASSESSMENT — ANXIETY QUESTIONNAIRES: GAD7 TOTAL SCORE: 6

## 2020-06-19 NOTE — TELEPHONE ENCOUNTER
Prior Authorization Approval    Authorization Effective Date: 5/20/2020  Authorization Expiration Date: 6/19/2021  Medication: Vyvanse 40mg-APPROVED  Approved Dose/Quantity:    Reference #:     Insurance Company: [employer] - Phone [phone] Fax [phone]  Expected CoPay:       CoPay Card Available:      Foundation Assistance Needed:    Which Pharmacy is filling the prescription (Not needed for infusion/clinic administered): [facility] - [place], [place] - [address] [place] [address]  Pharmacy Notified: Yes  Patient Notified: Yes  **Instructed pharmacy to notify patient when script is ready to /ship.**

## 2020-06-19 NOTE — TELEPHONE ENCOUNTER
PA Initiation    Medication: Vyvanse 40mg  Insurance Company: [employer] - Phone [phone] Fax [phone]  Pharmacy Filling the Rx: [facility] [place] [address] [facility]  Filling Pharmacy Phone: [phone]  Filling Pharmacy Fax: [phone]  Start Date: 6/18/2020

## 2020-07-23 ENCOUNTER — VIRTUAL VISIT (OUTPATIENT)
Dept: PSYCHIATRY | Facility: CLINIC | Age: 25
End: 2020-07-23
Payer: COMMERCIAL

## 2020-07-23 DIAGNOSIS — F33.0 MILD RECURRENT MAJOR DEPRESSION (H): Primary | ICD-10-CM

## 2020-07-23 DIAGNOSIS — F90.2 ADHD (ATTENTION DEFICIT HYPERACTIVITY DISORDER), COMBINED TYPE: ICD-10-CM

## 2020-07-23 PROCEDURE — 99214 OFFICE O/P EST MOD 30 MIN: CPT | Mod: 95 | Performed by: NURSE PRACTITIONER

## 2020-07-23 RX ORDER — LISDEXAMFETAMINE DIMESYLATE 40 MG/1
40 CAPSULE ORAL EVERY MORNING
Qty: 30 CAPSULE | Refills: 0 | Status: CANCELLED | OUTPATIENT
Start: 2020-07-23

## 2020-07-23 RX ORDER — LISDEXAMFETAMINE DIMESYLATE 50 MG/1
50 CAPSULE ORAL EVERY MORNING
Qty: 30 CAPSULE | Refills: 0 | Status: SHIPPED | OUTPATIENT
Start: 2020-07-23 | End: 2020-09-04

## 2020-07-23 ASSESSMENT — ANXIETY QUESTIONNAIRES
7. FEELING AFRAID AS IF SOMETHING AWFUL MIGHT HAPPEN: NOT AT ALL
6. BECOMING EASILY ANNOYED OR IRRITABLE: NOT AT ALL
IF YOU CHECKED OFF ANY PROBLEMS ON THIS QUESTIONNAIRE, HOW DIFFICULT HAVE THESE PROBLEMS MADE IT FOR YOU TO DO YOUR WORK, TAKE CARE OF THINGS AT HOME, OR GET ALONG WITH OTHER PEOPLE: SOMEWHAT DIFFICULT
2. NOT BEING ABLE TO STOP OR CONTROL WORRYING: NOT AT ALL
3. WORRYING TOO MUCH ABOUT DIFFERENT THINGS: NOT AT ALL
5. BEING SO RESTLESS THAT IT IS HARD TO SIT STILL: NOT AT ALL
1. FEELING NERVOUS, ANXIOUS, OR ON EDGE: SEVERAL DAYS
GAD7 TOTAL SCORE: 2

## 2020-07-23 ASSESSMENT — PATIENT HEALTH QUESTIONNAIRE - PHQ9
SUM OF ALL RESPONSES TO PHQ QUESTIONS 1-9: 4
5. POOR APPETITE OR OVEREATING: SEVERAL DAYS

## 2020-07-23 NOTE — PATIENT INSTRUCTIONS
1.  Vyvanse increased to 50 mg daily  2. Monitor blood pressure and pulse      Continue all other medications as reviewed per electronic medical record today.     Safety plan reviewed. To the Emergency Department as needed or call after hours crisis line at [phone] or [phone]. [place] Crisis Text [place]. Text [place] to 774091 or Suicide LifeLine Chat: C[employer].org/chat/    To schedule individual or family therapy, call [facility] at [phone].    Schedule an appointment with me in 4 weeks or sooner as needed. Call [facility] at [phone] to schedule.    Follow up with primary care provider as planned or for acute medical concerns.    Call the psychiatric nurse line with medication questions or concerns at [phone].    [employer] may be used to communicate with your provider, but this is not intended to be used for emergencies.    Crisis Resources:    National Suicide Prevention Lifeline: 471.391.7240 (TTY: 669.107.8052). Call anytime for help.  (www.suicidepreventionlifeline.org)  National [place] on Mental Illness (www.leni.org): 132.532.6861 or 147-525-8084.   Mental Health Association (www.mentalhealth.org): 269.981.6321 or 992-139-3966.  [place] Crisis [place]: Text [place] to 666465  Suicide LifeLine Chat: suicideCloudVertical.org/chat

## 2020-07-23 NOTE — PROGRESS NOTES
"Nathan Nunez is a 25 year old male who is being evaluated via a billable telephone visit.      The patient has been notified of following:     \"This telephone visit will be conducted via a call between you and your physician/provider. We have found that certain health care needs can be provided without the need for a physical exam.  This service lets us provide the care you need with a short phone conversation.  If a prescription is necessary we can send it directly to your pharmacy.  If lab work is needed we can place an order for that and you can then stop by our lab to have the test done at a later time.    Telephone visits are billed at different rates depending on your insurance coverage. During this emergency period, for some insurers they may be billed the same as an in-person visit.  Please reach out to your insurance provider with any questions.    If during the course of the call the physician/provider feels a telephone visit is not appropriate, you will not be charged for this service.\"    Patient has given verbal consent for Telephone visit?  Yes    What phone number would you like to be contacted at? [phone]    How would you like to obtain your AVS? Mail a copy    Phone call duration: 20 minutes    Keysha Bashir NP          Outpatient Psychiatric Progress Note    Name: Nathan Nunez   : 1995                    Primary Care Provider: Nestor Shultz DO   Therapist: None    PHQ-9 scores:  PHQ-9 SCORE 4/10/2020 2020 2020   PHQ-9 Total Score 4 8 4       ISMA-7 scores:  ISMA-7 SCORE 4/10/2020 2020 2020   Total Score 5 6 2       Patient Identification:    Patient is a 25 year old year old,   White Choose not to answer male  who presents for return visit with me.  Patient is currently employed full time. Patient attended the session alone. Patient prefers to be called: \" Dipak\".    Interim History:    I last saw Nathan Nunez for " outpatient psychiatry Return Visit on June 18, 2020.     During that appointment, he reported that he sometimes forgets to take his medication later in the day.  He continues to have some breakthrough concentration and organization deficits at work.  We changed from Adderall to Vyvanse 40 mg daily.. .     Current medications include: lisdexamfetamine (VYVANSE) 40 MG capsule, Take 1 capsule (40 mg) by mouth every morning  amphetamine-dextroamphetamine (ADDERALL) 20 MG tablet, Take 1 tablet (20 mg) by mouth 2 times daily (Patient not taking: Reported on 7/23/2020)    No current facility-administered medications on file prior to visit.        The [place] Prescription Monitoring Program has been reviewed and there are no concerns about diversionary activity for controlled substances at this time.      I was able to review most recent Primary Care Provider, specialty provider, and therapy visit notes that I have access to.     Today, patient reports  that the Vyvanse is working well.  He is able to sustain his attention and focus for a longer period of time.  He does not feels as jumpy.  Home life is good.  He sleeps well at night. Some weight loss.  There is less free time at work and so unable to eat as much.  He has been more active after work at home.  He has been donating plasma twice a week.  Sometimes his heart rate is elevated and he cannot donate plasma.       has no past medical history on file.    Social history updates:    He has been replacing fire hydrants.  He commutes to [place].    Substance use updates:    Rare alcohol use  Tobacco use: No    Vital Signs:   There were no vitals taken for this visit.    Labs:    Most recent laboratory results reviewed and no new labs.     Review of Systems:  10 systems (general, cardiovascular, respiratory, eyes, ENT, endocrine, GI, , M/S, neurological) were reviewed. Most pertinent finding(s) is/are: He denies chest pain, no shortness of breath, no skin rashes.  The remaining systems are all unremarkable.    Mental Status Examination:  Appearance:  awake, alert  Attitude:  cooperative   Eye Contact:  unable to assess  Gait and Station: No assistive Devices used and No dizziness or falls  Psychomotor Behavior:  unable to assess  Oriented to:  time, person, and place  Attention Span and Concentration:  Normal  Speech:   clear, coherent and Speaks: English  Mood:  better  Affect:  appropriate and in normal range  Associations:  no loose associations  Thought Process:  logical and goal oriented  Thought Content:  no evidence of suicidal ideation or homicidal ideation, no auditory hallucinations present and no visual hallucinations present  Recent and Remote Memory:  intact Not formally assessed. No amnesia.  Fund of Knowledge: appropriate  Insight:  good  Judgment:  intact  Impulse Control:  intact    Suicide Risk Assessment:  Today Nathan Nunez reports that he is having no thoughts to want to end his life or to harm other people. In addition, there are notable risk factors for self-harm, including anxiety. However, risk is mitigated by commitment to family, sobriety, ability to volunteer a safety plan, future oriented, no access to firearms or weapons, denies suicidal intent or plan and denies homicidal ideation, intent, or plan. Therefore, based on all available evidence including the factors cited above, Nathan Nunez does not appear to be at imminent risk for self-harm, does not meet criteria for a 72-hr hold, and therefore remains appropriate for ongoing outpatient level of care.  A thorough assessment of risk factors related to suicide and self-harm have been reviewed and are noted above. The patient convincingly denies suicidality on several occasions. Local community safety resources printed and reviewed for patient to use if needed. There was no deceit detected, and the patient presented in a manner that was believable.     DSM5  Diagnosis:  Attention-Deficit/Hyperactivity Disorder  314.01 (F90.2) Combined presentation  296.35 (F33.41)  Major Depressive Disorder, Recurrent Episode, In partial remission With anxious distress    Medical comorbidities include: There are no active problems to display for this patient.      Assessment:    Nathan Nunez is tolerating the Vyvanse well.  He finds he is able to sustain his attention and focus while at work better and it even extends to his home life after a workday when he is able to get more things accomplished around the house.  He donates plasma twice a week and sometimes is unable to do so due to tachycardia with a heart rate of 105.  Even though I am increasing the Vyvanse to 50 mg today to continue to sustain his attention, I am urging him to document his blood pressure and pulse readings when he does go to the plasma center twice a week.  He verbalized understanding of this and also the understanding to contact me should his blood pressure and pulse sustained an elevated pace in order to decrease the dose of the Vyvanse back to 40 mg daily.    Medication side effects and alternatives were reviewed. Health promotion activities recommended and reviewed today. All questions addressed. Education and counseling completed regarding risks and benefits of medications and psychotherapy options.    Treatment Plan:      1.  Vyvanse increased to 50 mg daily  2. Monitor blood pressure and pulse      Continue all other medications as reviewed per electronic medical record today.     Safety plan reviewed. To the Emergency Department as needed or call after hours crisis line at [phone] or [phone]. [place] Crisis Text [place]. Text [place] to 762689 or Suicide LifeLine Chat: suicidepreventionlifeline.org/chat/    To schedule individual or family therapy, call [facility] at [phone].    Schedule an appointment with me in 4 weeks or sooner as needed. Call [facility] at [phone] to schedule.    Follow up with primary care provider as planned or for acute medical concerns.    Call the psychiatric nurse line with medication questions or concerns at [phone].    [employer] may be used to communicate with your provider, but this is not intended to be used for emergencies.    Crisis Resources:    National Suicide Prevention Lifeline: 505.339.9692 (TTY: 933.603.3951). Call anytime for help.  (www.suicidepreventionlifeline.org)  National [place] on Mental Illness (www.leni.org): 576.660.8701 or 063-537-9673.   Mental Health Association (www.mentalhealth.org): 347.601.1826 or 641-649-4422.  [place] Crisis Text [place]: Text [place] to 517949  Suicide LifeLine Chat: suicideprehurleypalmerflattline.org/chat    Administrative Billing:   Time spent with patient was spent in counseling and coordination of care regarding above diagnoses and treatment plan.    Patient Status:  Patient will continue to be seen for ongoing consultation and stabilization.    Signed:   TIRSO Garcia-BC   Psychiatry

## 2020-07-24 ASSESSMENT — ANXIETY QUESTIONNAIRES: GAD7 TOTAL SCORE: 2

## 2020-09-04 ENCOUNTER — VIRTUAL VISIT (OUTPATIENT)
Dept: PSYCHIATRY | Facility: CLINIC | Age: 25
End: 2020-09-04
Payer: COMMERCIAL

## 2020-09-04 DIAGNOSIS — F33.0 MILD RECURRENT MAJOR DEPRESSION (H): ICD-10-CM

## 2020-09-04 DIAGNOSIS — F90.2 ADHD (ATTENTION DEFICIT HYPERACTIVITY DISORDER), COMBINED TYPE: Primary | ICD-10-CM

## 2020-09-04 PROCEDURE — 99214 OFFICE O/P EST MOD 30 MIN: CPT | Mod: 95 | Performed by: NURSE PRACTITIONER

## 2020-09-04 RX ORDER — LISDEXAMFETAMINE DIMESYLATE 50 MG/1
50 CAPSULE ORAL EVERY MORNING
Qty: 30 CAPSULE | Refills: 0 | Status: SHIPPED | OUTPATIENT
Start: 2020-09-04 | End: 2020-10-19

## 2020-09-04 ASSESSMENT — ANXIETY QUESTIONNAIRES
1. FEELING NERVOUS, ANXIOUS, OR ON EDGE: NOT AT ALL
IF YOU CHECKED OFF ANY PROBLEMS ON THIS QUESTIONNAIRE, HOW DIFFICULT HAVE THESE PROBLEMS MADE IT FOR YOU TO DO YOUR WORK, TAKE CARE OF THINGS AT HOME, OR GET ALONG WITH OTHER PEOPLE: SOMEWHAT DIFFICULT
3. WORRYING TOO MUCH ABOUT DIFFERENT THINGS: SEVERAL DAYS
7. FEELING AFRAID AS IF SOMETHING AWFUL MIGHT HAPPEN: NOT AT ALL
2. NOT BEING ABLE TO STOP OR CONTROL WORRYING: NOT AT ALL
GAD7 TOTAL SCORE: 4
5. BEING SO RESTLESS THAT IT IS HARD TO SIT STILL: SEVERAL DAYS
6. BECOMING EASILY ANNOYED OR IRRITABLE: SEVERAL DAYS

## 2020-09-04 ASSESSMENT — PATIENT HEALTH QUESTIONNAIRE - PHQ9
5. POOR APPETITE OR OVEREATING: SEVERAL DAYS
SUM OF ALL RESPONSES TO PHQ QUESTIONS 1-9: 7

## 2020-09-04 NOTE — PATIENT INSTRUCTIONS
1.  Continue Vyvanse 50 mg daily    2.  I will be looking for a picture of the rash to share with your primary medical provider through my chart      3.  Continue to monitor your blood pressure and pulse    Continue all other medications as reviewed per electronic medical record today.     Safety plan reviewed. To the Emergency Department as needed or call after hours crisis line at [phone] or [phone]. [place] Crisis Text [place]. Text [place] to 509273 or Suicide LifeLine Chat: suicideGuarnic.org/chat/    To schedule individual or family therapy, call [facility] at [phone].    Schedule an appointment with me in October or sooner as needed. Call [facility] at [phone] to schedule.    Follow up with primary care provider as planned or for acute medical concerns.    Call the psychiatric nurse line with medication questions or concerns at [phone].    [employer] may be used to communicate with your provider, but this is not intended to be used for emergencies.    Crisis Resources:    National Suicide Prevention Lifeline: 421.731.5935 (TTY: 565.705.9809). Call anytime for help.  (www.suicidepreventionlifeline.org)  National [place] on Mental Illness (www.leni.org): 829.195.1738 or 459-846-7379.   Mental Health Association (www.mentalhealth.org): 280.976.3216 or 794-357-9920.  [place] Crisis Text [place]: Text [place] to 854926  Suicide LifeLine Chat: suicideNagual Soundsline.org/chat

## 2020-09-04 NOTE — PROGRESS NOTES
"Nathan Nunez is a 25 year old male who is being evaluated via a billable telephone visit.      The patient has been notified of following:     \"This telephone visit will be conducted via a call between you and your physician/provider. We have found that certain health care needs can be provided without the need for a physical exam.  This service lets us provide the care you need with a short phone conversation.  If a prescription is necessary we can send it directly to your pharmacy.  If lab work is needed we can place an order for that and you can then stop by our lab to have the test done at a later time.    Telephone visits are billed at different rates depending on your insurance coverage. During this emergency period, for some insurers they may be billed the same as an in-person visit.  Please reach out to your insurance provider with any questions.    If during the course of the call the physician/provider feels a telephone visit is not appropriate, you will not be charged for this service.\"    Patient has given verbal consent for Telephone visit?  Yes    What phone number would you like to be contacted at? [phone]    How would you like to obtain your AVS? Mail a copy    Phone call duration: 20 minutes     Keysha Bashir NP        Outpatient Psychiatric Progress Note    Name: Nathan Nunez   : 1995                    Primary Care Provider: Nestor Shultz DO   Therapist: none     PHQ-9 scores:  PHQ-9 SCORE 2020   PHQ-9 Total Score 8 4 7       ISMA-7 scores:  ISMA-7 SCORE 2020   Total Score 6 2 4       Patient Identification:    Patient is a 25 year old year old,   White Choose not to answer male  who presents for return visit with me.  Patient is currently employed full time. Patient attended the session alone. Patient prefers to be called: \"Dipak\".    Interim History:    I last saw Nathan Nunez for " outpatient psychiatry Return Visit on July 23, 2020.     During that appointment, he reported that  He was better able to sustain his attention and focus while at work better and it even extends to his home life after a workday when he is able to get more things accomplished around the house.  He donates plasma twice a week and sometimes is unable to do so due to tachycardia with a heart rate of 105.  Even though I am increasing the Vyvanse to 50 mg today to continue to sustain his attention, I am urging him to document his blood pressure and pulse readings when he does go to the plasma center twice a week.  He verbalized understanding of this and also the understanding to contact me should his blood pressure and pulse sustained an elevated pace in order to decrease the dose of the Vyvanse back to 40 mg daily.   .     Current medications include: lisdexamfetamine (VYVANSE) 50 MG capsule, Take 1 capsule (50 mg) by mouth every morning    No current facility-administered medications on file prior to visit.        The [place] Prescription Monitoring Program has been reviewed and there are no concerns about diversionary activity for controlled substances at this time.      I was able to review most recent Primary Care Provider, specialty provider, and therapy visit notes that I have access to.     Today, patient reports that he has been having acne breakout on outside of his thighs.  I asked him to take a picture of it. No fever, no itching, no spreading, no burning.  He has been keeping track of his BP average is 100-115/ 70.  He has had no weight changes.  He is going to start a new diet with his wife - portion control and intermittent fasting.  With the increase in vyvanse dose he is better able to retain and learn new information.       has no past medical history on file.    Social history updates:    He continues to donate plasma on a  Regular basis.  Since he started going in later, his heart rate and blood  pressure have been WNL.      Substance use updates:    Rare alcohol use - one beer a week.  Tobacco use: No    Vital Signs:   There were no vitals taken for this visit.    Labs:    Most recent laboratory results reviewed and no new labs.     Review of Systems:  10 systems (general, cardiovascular, respiratory, eyes, ENT, endocrine, GI, , M/S, neurological) were reviewed. Most pertinent finding(s) is/are: Acne like rash on outer thighs, no chest pain, no shortness  Of breath. The remaining systems are all unremarkable.    Mental Status Examination:  Appearance:  awake, alert  Attitude:  cooperative   Eye Contact:  unable to assess  Gait and Station: No assistive Devices used and No dizziness or falls  Psychomotor Behavior:  unable to assess  Oriented to:  time, person, and place  Attention Span and Concentration:  Normal  Speech:   clear, coherent and Speaks: English  Mood:  better  Affect:  appropriate and in normal range  Associations:  no loose associations  Thought Process:  logical and goal oriented  Thought Content:  no evidence of suicidal ideation or homicidal ideation, no auditory hallucinations present and no visual hallucinations present  Recent and Remote Memory:  intact Not formally assessed. No amnesia.  Fund of Knowledge: appropriate  Insight:  good  Judgment:  intact  Impulse Control:  intact    Suicide Risk Assessment:  Today Nathan Nunez reports that he is having no thoughts to want to harm himself or other people. In addition, there are notable risk factors for self-harm, including anxiety and mood change. However, risk is mitigated by commitment to family, sobriety, history of seeking help when needed, future oriented, no access to firearms or weapons, denies suicidal intent or plan and denies homicidal ideation, intent, or plan. Therefore, based on all available evidence including the factors cited above, Nathan Nunez does not appear to be at imminent risk for self-harm,  does not meet criteria for a 72-hr hold, and therefore remains appropriate for ongoing outpatient level of care.  A thorough assessment of risk factors related to suicide and self-harm have been reviewed and are noted above. The patient convincingly denies suicidality on several occasions. Local community safety resources printed and reviewed for patient to use if needed. There was no deceit detected, and the patient presented in a manner that was believable.     DSM5 Diagnosis:  Attention-Deficit/Hyperactivity Disorder  314.01 (F90.2) Combined presentation  296.35 (F33.41)  Major Depressive Disorder, Recurrent Episode, In partial remission With anxious distress    Medical comorbidities include: There are no active problems to display for this patient.      Assessment:    Nathan Nunez reports in today that he has developed a rash on the outer parts of his upper thighs that has not spread with no itching or burning.  I asked him to send me a picture of his rash through my chart that I can she with his primary provider before increasing his dose of Vyvanse.  At this point with the current dose adjustment he reports being better able to concentrate and perform job duties.  He tells me it is easier for him to learn new tasks at his place of employment.  He continues to donate plasma on a regular basis and when he is there, his blood pressure and pulse are within normal limits..    Medication side effects and alternatives were reviewed. Health promotion activities recommended and reviewed today. All questions addressed. Education and counseling completed regarding risks and benefits of medications and psychotherapy options.    Treatment Plan:        1.  Continue Vyvanse 50 mg daily    2.  I will be looking for a picture of the rash to share with your primary medical provider through my chart    3.  Continue to monitor your blood pressure and pulse      Continue all other medications as reviewed per electronic  medical record today.     Safety plan reviewed. To the Emergency Department as needed or call after hours crisis line at [phone] or [phone]. [place] Crisis Text Line. Text [place] to 359621 or Suicide LifeLine Chat: suicideTonx.org/chat/    To schedule individual or family therapy, call [facility] at [phone].    Schedule an appointment with me in October or sooner as needed. Call [facility] at [phone] to schedule.    Follow up with primary care provider as planned or for acute medical concerns.    Call the psychiatric nurse line with medication questions or concerns at [phone].    Soum may be used to communicate with your provider, but this is not intended to be used for emergencies.    Crisis Resources:    National Suicide Prevention Lifeline: 985.650.6887 (TTY: 590.944.7263). Call anytime for help.  (www.suicidepreventionlifeline.org)  National [place] on Mental Illness (www.leni.org): 121.601.7182 or 872-937-7793.   Mental Health Association (www.mentalhealth.org): 956.496.1454 or 139-253-9493.  [place] Crisis Text Line: Text [place] to 599043  Suicide LifeLine Chat: suicideTonx.org/chat    Administrative Billing:   Time spent with patient was spent in counseling and coordination of care regarding above diagnoses and treatment plan.    Patient Status:  Patient will continue to be seen for ongoing consultation and stabilization.    Signed:   TIRSO Garcia-BC   Psychiatry

## 2020-09-05 ASSESSMENT — ANXIETY QUESTIONNAIRES: GAD7 TOTAL SCORE: 4

## 2020-11-25 DIAGNOSIS — F90.2 ADHD (ATTENTION DEFICIT HYPERACTIVITY DISORDER), COMBINED TYPE: ICD-10-CM

## 2020-11-25 RX ORDER — LISDEXAMFETAMINE DIMESYLATE 50 MG/1
50 CAPSULE ORAL EVERY MORNING
Qty: 30 CAPSULE | Refills: 0 | Status: SHIPPED | OUTPATIENT
Start: 2020-11-25 | End: 2020-12-16

## 2020-11-25 NOTE — TELEPHONE ENCOUNTER
Controlled Substance Refill Request for Vyvanse  Last refill: 10/19/20    Last clinic visit: 9/4/20    Clinic visit frequency required: Q 2 months  Next appt: 1/8/20    Controlled substance agreement on file: No.    Documentation in problem list reviewed:  Yes    Processing:  Rx to be electronically transmitted to pharmacy by provider     [place] reviewed:  reviewed- no concerns  [place]  [email or web address]

## 2020-11-25 NOTE — TELEPHONE ENCOUNTER
Reason for Call:   medication refill:    Do you use a [facility]?  Name of the pharmacy and phone number for the current request:  [facility] in [place]    Name of the medication requested: Vyvance    Other request: Pt stated he only has a couples pills left and will need a refill before his upcoming appointment on 01/08/21. Pt had contacted pharmacy and they stated the provider needs to send in the request.     Can we leave a detailed message on this number? YES    Phone number patient can be reached at: Cell number on file:    Telephone Information:   Mobile [phone]       Best Time: any    Call taken on 11/25/2020 at 11:06 AM by Enrique Schmidt

## 2020-12-15 ENCOUNTER — MYC MEDICAL ADVICE (OUTPATIENT)
Dept: PSYCHOLOGY | Facility: CLINIC | Age: 25
End: 2020-12-15

## 2020-12-15 DIAGNOSIS — F90.2 ADHD (ATTENTION DEFICIT HYPERACTIVITY DISORDER), COMBINED TYPE: ICD-10-CM

## 2020-12-16 RX ORDER — LISDEXAMFETAMINE DIMESYLATE 50 MG/1
50 CAPSULE ORAL EVERY MORNING
Qty: 30 CAPSULE | Refills: 0 | Status: SHIPPED | OUTPATIENT
Start: 2020-12-16

## 2020-12-16 NOTE — TELEPHONE ENCOUNTER
Last Rx written on 11/25 failed to transmit to pharmacy:      Routing to covering providers for review.    Lea Lainez, RN    Nurse Liaison  [facility]

## 2021-01-03 ENCOUNTER — HEALTH MAINTENANCE LETTER (OUTPATIENT)
Age: 26
End: 2021-01-03

## 2021-10-10 ENCOUNTER — HEALTH MAINTENANCE LETTER (OUTPATIENT)
Age: 26
End: 2021-10-10

## 2022-01-29 ENCOUNTER — HEALTH MAINTENANCE LETTER (OUTPATIENT)
Age: 27
End: 2022-01-29

## 2022-09-18 ENCOUNTER — HEALTH MAINTENANCE LETTER (OUTPATIENT)
Age: 27
End: 2022-09-18

## 2023-05-07 ENCOUNTER — HEALTH MAINTENANCE LETTER (OUTPATIENT)
Age: 28
End: 2023-05-07